# Patient Record
Sex: FEMALE | Race: AMERICAN INDIAN OR ALASKA NATIVE
[De-identification: names, ages, dates, MRNs, and addresses within clinical notes are randomized per-mention and may not be internally consistent; named-entity substitution may affect disease eponyms.]

---

## 2017-11-21 ENCOUNTER — HOSPITAL ENCOUNTER (INPATIENT)
Dept: HOSPITAL 5 - ED | Age: 37
LOS: 2 days | Discharge: HOME HEALTH | DRG: 65 | End: 2017-11-23
Attending: INTERNAL MEDICINE | Admitting: INTERNAL MEDICINE
Payer: COMMERCIAL

## 2017-11-21 DIAGNOSIS — F17.210: ICD-10-CM

## 2017-11-21 DIAGNOSIS — I63.9: Primary | ICD-10-CM

## 2017-11-21 DIAGNOSIS — Z86.73: ICD-10-CM

## 2017-11-21 DIAGNOSIS — Z82.49: ICD-10-CM

## 2017-11-21 DIAGNOSIS — Z83.3: ICD-10-CM

## 2017-11-21 DIAGNOSIS — Z88.6: ICD-10-CM

## 2017-11-21 DIAGNOSIS — Z79.84: ICD-10-CM

## 2017-11-21 DIAGNOSIS — J45.909: ICD-10-CM

## 2017-11-21 DIAGNOSIS — I10: ICD-10-CM

## 2017-11-21 DIAGNOSIS — Z79.899: ICD-10-CM

## 2017-11-21 DIAGNOSIS — E11.9: ICD-10-CM

## 2017-11-21 DIAGNOSIS — D72.829: ICD-10-CM

## 2017-11-21 DIAGNOSIS — G81.91: ICD-10-CM

## 2017-11-21 LAB
%HYPO/RBC NFR BLD AUTO: (no result) %
ANION GAP SERPL CALC-SCNC: 14 MMOL/L
ANISOCYTOSIS BLD QL SMEAR: (no result)
APTT BLD: 28.5 SEC. (ref 24.2–36.6)
BLASTOCYTES % (MANUAL): 0 %
BUN SERPL-MCNC: 7 MG/DL (ref 7–17)
BUN/CREAT SERPL: 18 %
CALCIUM SERPL-MCNC: 8.4 MG/DL (ref 8.4–10.2)
CHLORIDE SERPL-SCNC: 104 MMOL/L (ref 98–107)
CO2 SERPL-SCNC: 29 MMOL/L (ref 22–30)
GLUCOSE SERPL-MCNC: 96 MG/DL (ref 65–100)
HCT VFR BLD CALC: 35.3 % (ref 30.3–42.9)
HGB BLD-MCNC: 10.7 GM/DL (ref 10.1–14.3)
INR PPP: 0.89 (ref 0.87–1.13)
MAGNESIUM SERPL-MCNC: 1.8 MG/DL (ref 1.7–2.3)
MCH RBC QN AUTO: 24 PG (ref 28–32)
MCHC RBC AUTO-ENTMCNC: 30 % (ref 30–34)
MCV RBC AUTO: 79 FL (ref 79–97)
PLATELET # BLD: 167 K/MM3 (ref 140–440)
POTASSIUM SERPL-SCNC: 3.5 MMOL/L (ref 3.6–5)
RBC # BLD AUTO: 4.47 M/MM3 (ref 3.65–5.03)
SODIUM SERPL-SCNC: 143 MMOL/L (ref 137–145)
TOTAL CELLS COUNTED PERCENT: 2
WBC # BLD AUTO: 13.4 K/MM3 (ref 4.5–11)

## 2017-11-21 PROCEDURE — 83735 ASSAY OF MAGNESIUM: CPT

## 2017-11-21 PROCEDURE — 70498 CT ANGIOGRAPHY NECK: CPT

## 2017-11-21 PROCEDURE — 80061 LIPID PANEL: CPT

## 2017-11-21 PROCEDURE — 85610 PROTHROMBIN TIME: CPT

## 2017-11-21 PROCEDURE — 93306 TTE W/DOPPLER COMPLETE: CPT

## 2017-11-21 PROCEDURE — 85025 COMPLETE CBC W/AUTO DIFF WBC: CPT

## 2017-11-21 PROCEDURE — 85027 COMPLETE CBC AUTOMATED: CPT

## 2017-11-21 PROCEDURE — 84703 CHORIONIC GONADOTROPIN ASSAY: CPT

## 2017-11-21 PROCEDURE — 85007 BL SMEAR W/DIFF WBC COUNT: CPT

## 2017-11-21 PROCEDURE — 93005 ELECTROCARDIOGRAM TRACING: CPT

## 2017-11-21 PROCEDURE — 36415 COLL VENOUS BLD VENIPUNCTURE: CPT

## 2017-11-21 PROCEDURE — 70450 CT HEAD/BRAIN W/O DYE: CPT

## 2017-11-21 PROCEDURE — 70544 MR ANGIOGRAPHY HEAD W/O DYE: CPT

## 2017-11-21 PROCEDURE — 82962 GLUCOSE BLOOD TEST: CPT

## 2017-11-21 PROCEDURE — 80048 BASIC METABOLIC PNL TOTAL CA: CPT

## 2017-11-21 PROCEDURE — 99285 EMERGENCY DEPT VISIT HI MDM: CPT

## 2017-11-21 PROCEDURE — 93880 EXTRACRANIAL BILAT STUDY: CPT

## 2017-11-21 PROCEDURE — 96372 THER/PROPH/DIAG INJ SC/IM: CPT

## 2017-11-21 PROCEDURE — 93010 ELECTROCARDIOGRAM REPORT: CPT

## 2017-11-21 PROCEDURE — 85730 THROMBOPLASTIN TIME PARTIAL: CPT

## 2017-11-21 PROCEDURE — 83036 HEMOGLOBIN GLYCOSYLATED A1C: CPT

## 2017-11-21 PROCEDURE — 70551 MRI BRAIN STEM W/O DYE: CPT

## 2017-11-21 RX ADMIN — INSULIN ASPART SCH: 100 INJECTION, SOLUTION INTRAVENOUS; SUBCUTANEOUS at 22:12

## 2017-11-21 NOTE — HISTORY AND PHYSICAL REPORT
History of Present Illness


Date of examination: 11/21/17


Date of admission: 


11/21/17





Chief complaint: 


Right upper extremity weakness





History of present illness: 


Patient is 37-year-old with history of diabetes, asthma and TIA.  She presents 

with right upper extremity numbness tingling and weakness for 2 days.  She 

states symptoms started yesterday when she had sudden numbness, tingling and 

weakness in the right upper extremity.  Initially she can still move however 

weakness became worse and today she cannot move right upper extremity at all. 

She therefore came to Emergency epartment for evaluation.  In ED,  CT head 

showed a possible acute ischemic stroke.  She will be admitted for further 

evaluation and management of acute ischemic stroke





Past History


Past Medical History: diabetes, other (TIA,asthma)


Past Surgical History: denies: No surgical history


Social history: lives with family, smoking, alcohol abuse, full code


Family history: CAD, diabetes





Medications and Allergies


 Allergies











Allergy/AdvReac Type Severity Reaction Status Date / Time


 


aspirin Allergy Unknown Unknown Verified 11/21/17 11:41











 Home Medications











 Medication  Instructions  Recorded  Confirmed  Last Taken  Type


 


Metformin HCl 1,000 mg PO BID 11/21/17 11/21/17 Unknown History


 


glipiZIDE [glipiZIDE ER] 5 mg PO DAILY 11/21/17 11/21/17 Unknown History














Review of Systems


All systems: negative (no chest pain, no shortness of breath, no fever, no 

abdominal pain, no urinary symptoms.  All other systems reviewed and are 

negative)





Exam





- Physical Exam


Narrative exam: 


GEN  APPEARANCE : Not in acute distress,


HEENT: Normocephalic  Atraumatic


NECK : supple, no JVD


LUNGS: clear to auscultation bilaterally, no rales, no wheeze


HEART: S1 and S2 regular, no murmurs, rubs or gallop


ABD: Soft, no tenderness, no distension, normal bowel sounds


EXT: No edema, no clubbing, no cyanosis


NEURO:Awake,alert,oriented x 3, no facial asymmetry, muscle power RUE 0/5, LUE 5

/5, RLE 4/5, LLE 5/5














- Constitutional


Vitals: 


 











Temp Pulse Resp BP Pulse Ox


 


 98.0 F   71   25 H  165/88   99 


 


 11/21/17 11:23  11/21/17 12:46  11/21/17 12:43  11/21/17 12:43  11/21/17 11:23














Results





- Labs


CBC & Chem 7: 


 11/21/17 13:48





 11/21/17 13:48


Labs: 


 Abnormal lab results











  11/21/17 11/21/17 11/21/17 Range/Units





  13:48 13:48 14:06 


 


WBC  13.4 H    (4.5-11.0)  K/mm3


 


MCH  24 L    (28-32)  pg


 


RDW  16.7 H    (13.2-15.2)  %


 


Seg Neutrophils # Man  8.4 H    (1.8-7.7)  K/mm3


 


Potassium   3.5 L   (3.6-5.0)  mmol/L


 


Creatinine   0.4 L   (0.7-1.2)  mg/dL


 


POC Glucose    131 H  ()  














Assessment and Plan


Acute ischemic stroke.  Admit to telemetry.  Patient presented with right upper 

extremity weakness and found to have right upper and lower extremity weakness.  

CT shows likely acute ischemic stroke. Admit using stroke protocol.  Obtain MRI 

and MRA of the brain, echocardiogram, carotid Dopplers.  She's allergic to 

aspirin. Start Plavix 75 mg by mouth now and daily.





Diabetes mellitus type II.  Fingerstick glucose Qac and hs. Continue glipizide 

and hold metformin.





Leukocytosis. Probably reactive. will monitor. Repeat in am.





Elevated Blood pressure elevated 156/81.  She denies any previous history of 

hypertension.  We'll only treat within first 24 hours if systolic BP greater 

than 220.





DVT prophylaxis with Lovenox





Full code status

## 2017-11-21 NOTE — MAGNETIC RESONANCE REPORT
FINAL REPORT



PROCEDURE:  MR BRAIN WO CON



TECHNIQUE:  Magnetic resonance imaging of the brain was performed

without contrast material. 



HISTORY:  stroke 



COMPARISON:  Head CT dated November 21, 2017



FINDINGS:  

Restricted diffusion is seen focally in the left corona radiata.

This corresponds to area of diminished density on CT study. It

has mild increased T2 signal and is consistent with a recent

infarct. Cerebral ventricles are normal in size. Cerebellar

tonsils are normally positioned. No intracranial hemorrhage or

mass effect is seen. Changes of chronic sinusitis are seen. 



IMPRESSION:  

Recent lacunar infarct is seen in the left corona radiata. No

evidence of hemorrhagic transformation is seen.

## 2017-11-21 NOTE — EMERGENCY DEPARTMENT REPORT
ED Neuro Deficit HPI





- General


Chief Complaint: Neuro Symptoms/Deficit


Stated Complaint: RIGHT SIDED WEAKNESS/NUMBNESS


Time Seen by Provider: 11/21/17 13:33


Source: EMS


Mode of arrival: Stretcher


Limitations: Physical Limitation





- History of Present Illness


Initial Comments: 





37-year-old female with a past medical history of asthma, diabetes, a mild 

stroke at age of 23 presents to the hospital with complaints of right sided 

numbness and weakness since yesterday afternoon.   Weakness and numbness 

includes right face, right arm, and right leg.  Weakness has been constant and 

progressively worsened and now patient is unable to lift her right arm without 

using her left hand to do so.  Patient denies headache, blurred vision, or 

slurred speech.  She is allergic to aspirin and does not take any other 

anticoagulants.  Patient presents here with elevated blood pressure and denies 

previous known history of hypertension.





- Related Data


Allergies/Adverse Reactions: 


 Allergies











Allergy/AdvReac Type Severity Reaction Status Date / Time


 


aspirin Allergy Unknown Unknown Verified 11/21/17 11:41














ED Review of Systems


ROS: 


Stated complaint: RIGHT SIDED WEAKNESS/NUMBNESS


Other details as noted in HPI





Comment: All other systems reviewed and negative


Other: 





Constitutional: No fevers chills 


Eyes: No eye pain visual changes


ENT: No ear pain or throat pain


Neck: Denies pain


Respiratory: Denies cough wheezing shortness of breath 


Cardiovascular: Denies chest pain, palpitations, syncope


GI: Denies abdominal pain, nausea, vomiting, diarrhea


: Denies dysuria


Musculoskeletal: Denies back pain, joint swelling 


Skin: Denies rash, lesions, erythema


Neurologic: As per HPI


Psychiatric: Denies suicidal ideation, hallucinations








ED Past Medical Hx





- Past Medical History


Hx Diabetes: Yes


Hx Asthma: Yes





- Surgical History


Past Surgical History?: No





- Social History


Smoking Status: Current Every Day Smoker


Substance Use Type: Alcohol





ED Neuro Physical Exam





- General


Limitations: Physical Limitation


Suspected Stroke: Yes





- NIHSS


Assessment Interval: Baseline


1a. Level of Consciousness: alert


1b. LOC Questions: answers correctly


1c. LOC Commands: performs tasks correctly


2. Best Gaze: normal


3. Visual: no visual loss


4. Facial Palsy: normal symmetrical movement


5b. Motor Arm Right: no movement


5a. Motor Arm Left: no drift


6a. Motor Leg Left: no drift


6b. Motor Leg Right: drift


7. Limb Ataxia: absent


8. Sensory: mild/moderate sensory loss (right face, right arm, right leg)


9. Best Language: no aphasia


10. Dysarthria: normal


11. Extinction/Inattention: no abnormality


Total Score: 6


Stroke Severity: Moderate Stroke





- Other


Other exam information: 





General: No limitations, patient is alert in no acute distress


Head exam: Atraumatic, normocephalic


Eyes exam: Normal appearance, pupils equal reactive to light, extraocular 

movements intact


ENT: Moist mucous membrane, normal oropharynx


Neck exam: Normal inspection, full range of motion, no meningismus nontender


Respiratory exam: Clear to auscultation bilateral, no wheezes, rales, crackles


Cardiovascular: Normal rate and rhythm, normal heart sounds


Abdomen: Soft, nondistended, and  nontender, with normal bowel sounds, no 

rebound, or guarding


Extremity: Full range of motion normal inspection no deformity


Back: Normal Inspection, full range of motion, no tenderness


Neurologic: Alert, oriented x3, as per HPI


Psychiatric: normal affect, normal mood


Skin: Warm, dry, intact





ED Course


 Vital Signs











  11/21/17 11/21/17 11/21/17





  11:23 12:05 12:43


 


Temperature 98.0 F  


 


Pulse Rate 71  71


 


Respiratory 16 16 25 H





Rate   


 


Blood Pressure 157/110  


 


Blood Pressure 157/110  165/88





[Right]   


 


O2 Sat by Pulse 99  





Oximetry   














  11/21/17





  12:46


 


Temperature 


 


Pulse Rate 71


 


Respiratory 





Rate 


 


Blood Pressure 


 


Blood Pressure 





[Right] 


 


O2 Sat by Pulse 





Oximetry 














- Reevaluation(s)


Reevaluation #1: 





11/21/17 15:32


Patient is upset about staying in the hospital because she is living in a hotel 

and is worried about her children and her animals.  I encouraged patient to 

stay in the hospital for further treatment.  Patient states she feels like the 

sensation arm is returning








- Lab Data


Result diagrams: 


 11/21/17 13:48





 11/21/17 13:48


 Lab Results











  11/21/17 11/21/17 11/21/17 Range/Units





  13:48 13:48 13:48 


 


WBC  13.4 H    (4.5-11.0)  K/mm3


 


RBC  4.47    (3.65-5.03)  M/mm3


 


Hgb  10.7    (10.1-14.3)  gm/dl


 


Hct  35.3    (30.3-42.9)  %


 


MCV  79    (79-97)  fl


 


MCH  24 L    (28-32)  pg


 


MCHC  30    (30-34)  %


 


RDW  16.7 H    (13.2-15.2)  %


 


Plt Count  167    (140-440)  K/mm3


 


Lymph #  Np    


 


Add Manual Diff  Complete    


 


Total Counted  100    


 


Seg Neuts % (Manual)  63.0    (40.0-70.0)  %


 


Band Neutrophils %  0    %


 


Lymphocytes % (Manual)  35.0    (13.4-35.0)  %


 


Reactive Lymphs % (Man)  0    %


 


Monocytes % (Manual)  2.0    (0.0-7.3)  %


 


Eosinophils % (Manual)  0    (0.0-4.3)  %


 


Basophils % (Manual)  0    (0.0-1.8)  %


 


Metamyelocytes %  0    %


 


Myelocytes %  0    %


 


Promyelocytes %  0    %


 


Blast Cells %  0    %


 


Nucleated RBC %  Not Reportable    


 


Seg Neutrophils # Man  8.4 H    (1.8-7.7)  K/mm3


 


Band Neutrophils #  0.0    K/mm3


 


Lymphocytes # (Manual)  4.7    (1.2-5.4)  K/mm3


 


Abs React Lymphs (Man)  0.0    K/mm3


 


Monocytes # (Manual)  0.3    (0.0-0.8)  K/mm3


 


Eosinophils # (Manual)  0.0    (0.0-0.4)  K/mm3


 


Basophils # (Manual)  0.0    (0.0-0.1)  K/mm3


 


Metamyelocytes #  0.0    K/mm3


 


Myelocytes #  0.0    K/mm3


 


Promyelocytes #  0.0    K/mm3


 


Blast Cells #  0.0    K/mm3


 


WBC Morphology  Not Reportable    


 


Hypersegmented Neuts  Not Reportable    


 


Hyposegmented Neuts  Not Reportable    


 


Hypogranular Neuts  Not Reportable    


 


Smudge Cells  Not Reportable    


 


Toxic Granulation  Not Reportable    


 


Toxic Vacuolation  Not Reportable    


 


Dohle Bodies  Not Reportable    


 


Pelger-Huet Anomaly  Not Reportable    


 


Jeanie Rods  Not Reportable    


 


Platelet Estimate  Cons    


 


Clumped Platelets  Not Reportable    


 


Plt Clumps, EDTA  Not Reportable    


 


Large Platelets  Not Reportable    


 


Giant Platelets  Not Reportable    


 


Platelet Satelliting  Not Reportable    


 


Plt Morphology Comment  Not Reportable    


 


RBC Morphology  Not Reportable    


 


Dimorphic RBCs  Not Reportable    


 


Polychromasia  Not Reportable    


 


Hypochromasia  1+    


 


Poikilocytosis  Not Reportable    


 


Anisocytosis  1+    


 


Microcytosis  Not Reportable    


 


Macrocytosis  Not Reportable    


 


Spherocytes  Not Reportable    


 


Pappenheimer Bodies  Not Reportable    


 


Sickle Cells  Not Reportable    


 


Target Cells  Not Reportable    


 


Tear Drop Cells  Not Reportable    


 


Ovalocytes  Not Reportable    


 


Helmet Cells  Not Reportable    


 


[place] Bodies  Not Reportable    


 


Cabot Rings  Not Reportable    


 


Jina Cells  Not Reportable    


 


Bite Cells  Not Reportable    


 


Crenated Cell  Not Reportable    


 


Elliptocytes  Not Reportable    


 


Acanthocytes (Spur)  Not Reportable    


 


Rouleaux  Not Reportable    


 


Hemoglobin C Crystals  Not Reportable    


 


Schistocytes  Not Reportable    


 


Malaria parasites  Not Reportable    


 


Ayad Bodies  Not Reportable    


 


Hem Pathologist Commnt  No    


 


Sodium   143   (137-145)  mmol/L


 


Potassium   3.5 L   (3.6-5.0)  mmol/L


 


Chloride   104.0   ()  mmol/L


 


Carbon Dioxide   29   (22-30)  mmol/L


 


Anion Gap   14   mmol/L


 


BUN   7   (7-17)  mg/dL


 


Creatinine   0.4 L   (0.7-1.2)  mg/dL


 


Estimated GFR   > 60   ml/min


 


BUN/Creatinine Ratio   18   %


 


Glucose   96   ()  mg/dL


 


POC Glucose     ()  


 


Calcium   8.4   (8.4-10.2)  mg/dL


 


Magnesium   1.80   (1.7-2.3)  mg/dL


 


HCG, Qual    Negative  (Negative)  














  11/21/17 Range/Units





  14:06 


 


WBC   (4.5-11.0)  K/mm3


 


RBC   (3.65-5.03)  M/mm3


 


Hgb   (10.1-14.3)  gm/dl


 


Hct   (30.3-42.9)  %


 


MCV   (79-97)  fl


 


MCH   (28-32)  pg


 


MCHC   (30-34)  %


 


RDW   (13.2-15.2)  %


 


Plt Count   (140-440)  K/mm3


 


Lymph #   


 


Add Manual Diff   


 


Total Counted   


 


Seg Neuts % (Manual)   (40.0-70.0)  %


 


Band Neutrophils %   %


 


Lymphocytes % (Manual)   (13.4-35.0)  %


 


Reactive Lymphs % (Man)   %


 


Monocytes % (Manual)   (0.0-7.3)  %


 


Eosinophils % (Manual)   (0.0-4.3)  %


 


Basophils % (Manual)   (0.0-1.8)  %


 


Metamyelocytes %   %


 


Myelocytes %   %


 


Promyelocytes %   %


 


Blast Cells %   %


 


Nucleated RBC %   


 


Seg Neutrophils # Man   (1.8-7.7)  K/mm3


 


Band Neutrophils #   K/mm3


 


Lymphocytes # (Manual)   (1.2-5.4)  K/mm3


 


Abs React Lymphs (Man)   K/mm3


 


Monocytes # (Manual)   (0.0-0.8)  K/mm3


 


Eosinophils # (Manual)   (0.0-0.4)  K/mm3


 


Basophils # (Manual)   (0.0-0.1)  K/mm3


 


Metamyelocytes #   K/mm3


 


Myelocytes #   K/mm3


 


Promyelocytes #   K/mm3


 


Blast Cells #   K/mm3


 


WBC Morphology   


 


Hypersegmented Neuts   


 


Hyposegmented Neuts   


 


Hypogranular Neuts   


 


Smudge Cells   


 


Toxic Granulation   


 


Toxic Vacuolation   


 


Dohle Bodies   


 


Pelger-Huet Anomaly   


 


Jeanie Rods   


 


Platelet Estimate   


 


Clumped Platelets   


 


Plt Clumps, EDTA   


 


Large Platelets   


 


Giant Platelets   


 


Platelet Satelliting   


 


Plt Morphology Comment   


 


RBC Morphology   


 


Dimorphic RBCs   


 


Polychromasia   


 


Hypochromasia   


 


Poikilocytosis   


 


Anisocytosis   


 


Microcytosis   


 


Macrocytosis   


 


Spherocytes   


 


Pappenheimer Bodies   


 


Sickle Cells   


 


Target Cells   


 


Tear Drop Cells   


 


Ovalocytes   


 


Helmet Cells   


 


[place] Bodies   


 


Cabot Rings   


 


Jina Cells   


 


Bite Cells   


 


Crenated Cell   


 


Elliptocytes   


 


Acanthocytes (Spur)   


 


Rouleaux   


 


Hemoglobin C Crystals   


 


Schistocytes   


 


Malaria parasites   


 


Ayad Bodies   


 


Hem Pathologist Commnt   


 


Sodium   (137-145)  mmol/L


 


Potassium   (3.6-5.0)  mmol/L


 


Chloride   ()  mmol/L


 


Carbon Dioxide   (22-30)  mmol/L


 


Anion Gap   mmol/L


 


BUN   (7-17)  mg/dL


 


Creatinine   (0.7-1.2)  mg/dL


 


Estimated GFR   ml/min


 


BUN/Creatinine Ratio   %


 


Glucose   ()  mg/dL


 


POC Glucose  131 H  ()  


 


Calcium   (8.4-10.2)  mg/dL


 


Magnesium   (1.7-2.3)  mg/dL


 


HCG, Qual   (Negative)  














- EKG Data


-: EKG Interpreted by Me


EKG shows normal: sinus rhythm, axis (qrs 54), QRS complexes (97, lvh), ST-T 

waves (no stemi/t inv)


Rate: normal (72)





- Radiology Data


Radiology results: report reviewed


ct head: decreased density within the left body of the caudate nucleus/bits 

left corona radiata.  11 x 9 mm.  Acute to subacute infarction is not excluded.

  Chronic bilateral ethmoid and maxillary sinusitis





- Medical Decision Making





Plan to admit patient to the hospital for further stroke workup.  Patient is 

outside of the window of TPA and cannot receive aspirin due to allergy.  

Hospitalist to determine if Plavix as indicated.  At this time patient is 

agreeable to admission





- Differential Diagnosis


multiple sclerosis, acute ischemic CVA, hemorrhagic CVA,





- Thrombolytic Inclusion/Exclusion


Thrombolytic Exclusion Criteria: Symptom Onset > 3 Hours


Critical Care Time: No


Critical care attestation.: 


If time is entered above; I have spent that time in minutes in the direct care 

of this critically ill patient, excluding procedure time.








ED Disposition


Clinical Impression: 


 Acute ischemic stroke, Right sided weakness, Hypertension, Diabetes





Disposition: [id number] OP ADMIT IP TO THIS HOSP


Is pt being admited?: Yes


Does the pt Need Aspirin: No (allergic)


Time of Disposition: 15:25 (Dr Burr/hosp)

## 2017-11-21 NOTE — CAT SCAN REPORT
FINAL REPORT



PROCEDURE:  CT HEAD/BRAIN WO CON



TECHNIQUE:  Computerized tomography of the head was performed

without contrast material. 



HISTORY:  right sided weakness and numbness 



COMPARISON:  None



FINDINGS:  

11 x 9 millimeter subtle decreased density is present of the body

of the left caudate nucleus/mid left corona radiata. There is no

intra or extra-axial hemorrhage. There is no mass effect or shift

of midline structures. There is no hydrocephalus. Chronic

bilateral maxillary and ethmoidal sinusitis is present. The skull

base and calvarium are intact. 



IMPRESSION:  

Decreased density within the left body of the caudate nucleus/mid

left corona radiata. This is 11 x 9 millimeters. Acute/subacute

infarction is not excluded. If desired, MRI with diffusion may be

of benefit. 



Chronic bilateral ethmoidal and maxillary sinusitis.

## 2017-11-21 NOTE — MAGNETIC RESONANCE REPORT
FINAL REPORT



PROCEDURE:  MR MRA/MRV HEAD WO CON



TECHNIQUE:  Unenhanced 3D time-of-flight images of the vessels of

the [place] are obtained.



HISTORY:  stroke  right-sided weakness 



COMPARISON:  No prior studies are available for comparison.



FINDINGS:  

 Right vertebral artery is dominant. Left vertebral artery

appears to terminate in PICA. Bilateral posterior communicating

arteries are seen and both P1 segments are present. Small

anterior communicating artery is seen. There is focal loss of

signal of the ICAs at the skull base. This is probably due to

motion artifact given the symmetric appearance. No diminished

flow is seen in the ICAs distal to this level. No intracranial

aneurysm is seen. There is a small size of the proximal right A2

segment. 



IMPRESSION:  

Likely artifactual loss of signal is seen in both ICAs in the

proximal carotid canals. 



Proximal right A2 segment is small which could be from artifact

or true stenosis. Correlation with CTA may be useful.

## 2017-11-22 LAB
ANION GAP SERPL CALC-SCNC: 15 MMOL/L
BUN SERPL-MCNC: 6 MG/DL (ref 7–17)
BUN/CREAT SERPL: 12 %
CALCIUM SERPL-MCNC: 8.2 MG/DL (ref 8.4–10.2)
CHLORIDE SERPL-SCNC: 105.2 MMOL/L (ref 98–107)
CHOLEST SERPL-MCNC: 146 MG/DL (ref 50–199)
CO2 SERPL-SCNC: 28 MMOL/L (ref 22–30)
GLUCOSE SERPL-MCNC: 111 MG/DL (ref 65–100)
HCT VFR BLD CALC: 33 % (ref 30.3–42.9)
HDLC SERPL-MCNC: 29 MG/DL (ref 40–59)
HGB BLD-MCNC: 10.5 GM/DL (ref 10.1–14.3)
MCH RBC QN AUTO: 25 PG (ref 28–32)
MCHC RBC AUTO-ENTMCNC: 32 % (ref 30–34)
MCV RBC AUTO: 78 FL (ref 79–97)
PLATELET # BLD: 174 K/MM3 (ref 140–440)
POTASSIUM SERPL-SCNC: 3.4 MMOL/L (ref 3.6–5)
RBC # BLD AUTO: 4.23 M/MM3 (ref 3.65–5.03)
SODIUM SERPL-SCNC: 145 MMOL/L (ref 137–145)
TRIGL SERPL-MCNC: 149 MG/DL (ref 2–149)
WBC # BLD AUTO: 9.4 K/MM3 (ref 4.5–11)

## 2017-11-22 RX ADMIN — INSULIN ASPART SCH: 100 INJECTION, SOLUTION INTRAVENOUS; SUBCUTANEOUS at 22:38

## 2017-11-22 RX ADMIN — INSULIN ASPART SCH: 100 INJECTION, SOLUTION INTRAVENOUS; SUBCUTANEOUS at 11:30

## 2017-11-22 RX ADMIN — ENOXAPARIN SODIUM SCH MG: 100 INJECTION SUBCUTANEOUS at 10:52

## 2017-11-22 RX ADMIN — INSULIN ASPART SCH: 100 INJECTION, SOLUTION INTRAVENOUS; SUBCUTANEOUS at 07:45

## 2017-11-22 RX ADMIN — INSULIN ASPART SCH: 100 INJECTION, SOLUTION INTRAVENOUS; SUBCUTANEOUS at 17:30

## 2017-11-22 RX ADMIN — CLOPIDOGREL BISULFATE SCH MG: 75 TABLET ORAL at 10:52

## 2017-11-22 RX ADMIN — GLIPIZIDE SCH MG: 5 TABLET, FILM COATED, EXTENDED RELEASE ORAL at 10:52

## 2017-11-22 NOTE — CAT SCAN REPORT
FINAL REPORT



PROCEDURE:  CT ANGIO NECK



TECHNIQUE:  Computerized tomographic angiography of the neck was

performed after the IV injection of iodinated nonionic contrast

including image processing. The image data was postprocessed

using 2-dimensional multiplanar reformatted (MPR) and

3-dimensional (MIP and/or volume rendered) techniques. 



HISTORY:  Stroke 



COMPARISON:  No prior studies are available for comparison.



Note: Assessment of carotid artery stenosis is based on 

measurement of the distal internal carotid artery diameter as the

denominator for stenosis calculations and the [place] [employer]

Symptomatic Carotid Endarterectomy Trial (NASCET) stenosis

criteria . CPT 3100F



FINDINGS:  

Thyroid gland is mildly prominent with multiple cysts or nodules.

Correlation with ultrasound is recommended. Right vertebral

artery is dominant. Left vertebral artery may terminate in PICA.

No vertebral artery stenosis is seen. No stenosis is seen of the

common carotid or internal carotid arteries in the neck. 



IMPRESSION:  

No vertebral or carotid artery stenosis is seen in the neck.



Cyst and/or nodules are seen in the thyroid gland. Correlation

with ultrasound is recommended.

## 2017-11-22 NOTE — CONSULTATION
History of Present Illness





- Reason for Consult


Consult date: 11/22/17


Left Hemisphere Stroke with Carotid Stenosis





- History of Present Illness





This patient is a 37-year-old  female that was admitted via the 

emergency room with an acute onset of right sided weakness 2 days prior to this 

admission.  Additionally, She was able to understand language, but was unable 

to speak.  She did not initially seek medical treatment hoping that this would 

be self-limited.  Her symptoms have nearly resolved at this point except for 

mild right upper extremity weakness.  A CT scan of the head suggested an acute/

subacute infarction.  An MRI suggests a recent lacunar infarct in the left 

corona radiata.





A preliminary carotid duplex suggest a left internal carotid artery stenosis of 

50-79% with less than 50% stenosis on the right based upon velocity.





A vascular surgery consult has been requested to further evaluate.





The patient does not take antiplatelet therapy on a daily basis.





Past History


Past Medical History: diabetes, other (asthma)


Past Surgical History: denies: No surgical history


Social history: lives with family (son), smoking (1 pack per day), alcohol abuse

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


Family history: CAD, diabetes





Medications and Allergies


 Allergies











Allergy/AdvReac Type Severity Reaction Status Date / Time


 


aspirin Allergy Unknown Unknown Verified 11/21/17 11:41











 Home Medications











 Medication  Instructions  Recorded  Confirmed  Last Taken  Type


 


Metformin HCl 1,000 mg PO BID 11/21/17 11/21/17 Unknown History


 


glipiZIDE [glipiZIDE ER] 5 mg PO DAILY 11/21/17 11/21/17 Unknown History











Active Meds: 


Active Medications





Acetaminophen (Tylenol)  650 mg PO Q4H PRN


   PRN Reason: Pain, Mild (1-3)


Atorvastatin Calcium (Lipitor)  40 mg PO QHS [facility]


Bisacodyl (Dulcolax)  10 mg GA QDAY PRN


   PRN Reason: Constipation


Clopidogrel Bisulfate (Plavix)  75 mg PO QDAY [facility]


   Last Admin: 11/22/17 10:52 Dose:  75 mg


Dextrose (D50w (25gm) Vial)  25 gm IV PRN PRN


   PRN Reason: Hypoglycemia


Enoxaparin Sodium (Lovenox)  40 mg SUB-Q QDAY [facility]


   Last Admin: 11/22/17 10:52 Dose:  40 mg


Glipizide (Glucotrol Xl)  5 mg PO DAILY [facility]


   Last Admin: 11/22/17 10:52 Dose:  5 mg


Insulin Aspart (Novolog)  0 units SUB-Q [facility]


   PRN Reason: Protocol


   Last Admin: 11/22/17 07:45 Dose:  Not Given


Insulin Aspart (Novolog)  0 units SUB-Q QHS [facility]


   PRN Reason: Protocol


   Last Admin: 11/21/17 22:12 Dose:  Not Given


Magnesium Hydroxide (Milk Of Magnesia)  30 ml PO Q4H PRN


   PRN Reason: Constipation


Metoclopramide HCl (Reglan)  10 mg PO Q6H PRN


   PRN Reason: Nausea And Vomiting


Morphine Sulfate (Morphine)  2 mg IV Q4H PRN


   PRN Reason: Pain, Moderate (4-6)


Ondansetron HCl (Zofran)  4 mg IV Q6H PRN


   PRN Reason: nausea or vomiting


Sodium Chloride (Sodium Chloride Flush Syringe 10 Ml)  10 ml IV PRN PRN


   PRN Reason: LINE FLUSH











Review of Systems


All systems: negative





Exam





- Constitutional


Vitals: 


 











Temp Pulse Resp BP Pulse Ox


 


 97.8 F   80   20   161/92   100 


 


 11/22/17 11:01  11/22/17 11:01  11/22/17 11:01  11/22/17 11:01  11/22/17 11:01











General appearance: Present: no acute distress





- EENT


Eyes: Present: EOM intact


ENT: hearing intact





- Respiratory


Respiratory effort: normal





- Cardiovascular


Rhythm: regular





- Extremities


Extremities: no ischemia





- Psychiatric


Psychiatric: appropriate mood/affect, intact judgment & insight, cooperative





- Neurologic


Neurologic: focal deficits (mild right upper extremity weakness when compared 

to the left)





Results





- Labs


CBC & Chem 7: 


 11/22/17 05:35





 11/22/17 05:35


Labs: 


 Abnormal lab results











  11/21/17 11/21/17 11/22/17 Range/Units





  13:48 20:29 05:35 


 


MCV     (79-97)  fl


 


MCH     (28-32)  pg


 


RDW     (13.2-15.2)  %


 


Potassium     (3.6-5.0)  mmol/L


 


BUN     (7-17)  mg/dL


 


Creatinine     (0.7-1.2)  mg/dL


 


Glucose     ()  mg/dL


 


POC Glucose   137 H   ()  


 


Hemoglobin A1c  8.2 H    (4-6)  %


 


Calcium     (8.4-10.2)  mg/dL


 


HDL Cholesterol    29 L  (40-59)  mg/dL














  11/22/17 11/22/17 11/22/17 Range/Units





  05:35 05:35 11:26 


 


MCV  78 L    (79-97)  fl


 


MCH  25 L    (28-32)  pg


 


RDW  16.3 H    (13.2-15.2)  %


 


Potassium   3.4 L   (3.6-5.0)  mmol/L


 


BUN   6 L   (7-17)  mg/dL


 


Creatinine   0.5 L   (0.7-1.2)  mg/dL


 


Glucose   111 H   ()  mg/dL


 


POC Glucose    183 H  ()  


 


Hemoglobin A1c     (4-6)  %


 


Calcium   8.2 L   (8.4-10.2)  mg/dL


 


HDL Cholesterol     (40-59)  mg/dL














Assessment and Plan





This patient presented with a 2 day history of acute right sided hemiparesis.  

Her symptoms have nearly resolved except for mild right upper extremity 

weakness.  





An MRI suggested a recent lacunar infarction.





A carotid duplex suggested a left internal carotid artery stenosis of 50-79% 

based upon velocity alone without evidence of significant intra-arterial 

plaque.  Her peak systolic velocities are in the 130s.  She has less than 50% 

stenosis on the left.  A vascular surgery consult was requested to further 

evaluate.  The patient does not take antiplatelet medication as an outpatient.





We'll check a CT angiogram of the carotids.





Lacunar infarcts do not typically represent embolic strokes.  Recommend 

neurology consult.  





We'll defer best medical management to neurology and hospitalist.








- Patient Problems


(1) Acute lacunar infarction


Current Visit: Yes   Status: Acute   





(2) Diabetes


Current Visit: Yes   Status: Acute   





(3) Hypertension


Current Visit: Yes   Status: Acute

## 2017-11-22 NOTE — PROGRESS NOTE
Assessment and Plan


Assessment and plan: 


CVA with mild right-sided weakness


- Patient is on Plavix and statin


-Physical therapy


-Neurology consult


Permissive hypertension


Carotid Doppler ultrasound


- Showed 50-79% stenosis


- Vascular consulted and recommended CTA neck


DVT prophylaxis


-Lovenox


Disposition


-Continue inpatient











History


Interval history: 


Patient was seen and evaluated this morning, patient has mild right upper 

extremity weakness, markedly improved.





Hospitalist Physical





- Physical exam


Narrative exam: 


 Not in cardiopulmonary distress. 


 The patient appeared well nourished and normally developed.


 Vital signs as documented.


 Head exam is unremarkable.


 No scleral icterus .


 Neck is without jugular venous distension, thyromegaly, or carotid bruits. 


 Lungs are clear to auscultation.


Cardiac exam reveals regular rate and  Rhythm. First and second heart sounds 

normal. No murmurs, rubs or gallops. 


Abdominal exam reveals normal bowel sounds, no masses, no organomegaly and no 

aortic enlargement. 


Extremities are nonedematous and both femoral and pedal pulses are normal.


CNS: Alert and oriented 3.  Right upper extremity mild weakness.








- Constitutional


Vitals: 


 











Temp Pulse Resp BP Pulse Ox


 


 97.8 F   80   20   161/92   100 


 


 11/22/17 11:01  11/22/17 11:01  11/22/17 11:01  11/22/17 11:01  11/22/17 11:01











General appearance: Present: no acute distress





Results





- Labs


CBC & Chem 7: 


 11/22/17 05:35





 11/22/17 05:35


Labs: 


 Laboratory Last Values











WBC  9.4 K/mm3 (4.5-11.0)   11/22/17  05:35    


 


RBC  4.23 M/mm3 (3.65-5.03)   11/22/17  05:35    


 


Hgb  10.5 gm/dl (10.1-14.3)   11/22/17  05:35    


 


Hct  33.0 % (30.3-42.9)   11/22/17  05:35    


 


MCV  78 fl (79-97)  L  11/22/17  05:35    


 


MCH  25 pg (28-32)  L  11/22/17  05:35    


 


MCHC  32 % (30-34)   11/22/17  05:35    


 


RDW  16.3 % (13.2-15.2)  H  11/22/17  05:35    


 


Plt Count  174 K/mm3 (140-440)   11/22/17  05:35    


 


Lymph #  Np   11/21/17  13:48    


 


Add Manual Diff  Complete   11/21/17  13:48    


 


Total Counted  100   11/21/17  13:48    


 


Seg Neuts % (Manual)  63.0 % (40.0-70.0)   11/21/17  13:48    


 


Band Neutrophils %  0 %  11/21/17  13:48    


 


Lymphocytes % (Manual)  35.0 % (13.4-35.0)   11/21/17  13:48    


 


Reactive Lymphs % (Man)  0 %  11/21/17  13:48    


 


Monocytes % (Manual)  2.0 % (0.0-7.3)   11/21/17  13:48    


 


Eosinophils % (Manual)  0 % (0.0-4.3)   11/21/17  13:48    


 


Basophils % (Manual)  0 % (0.0-1.8)   11/21/17  13:48    


 


Metamyelocytes %  0 %  11/21/17  13:48    


 


Myelocytes %  0 %  11/21/17  13:48    


 


Promyelocytes %  0 %  11/21/17  13:48    


 


Blast Cells %  0 %  11/21/17  13:48    


 


Nucleated RBC %  Not Reportable   11/21/17  13:48    


 


Seg Neutrophils # Man  8.4 K/mm3 (1.8-7.7)  H  11/21/17  13:48    


 


Band Neutrophils #  0.0 K/mm3  11/21/17  13:48    


 


Lymphocytes # (Manual)  4.7 K/mm3 (1.2-5.4)   11/21/17  13:48    


 


Abs React Lymphs (Man)  0.0 K/mm3  11/21/17  13:48    


 


Monocytes # (Manual)  0.3 K/mm3 (0.0-0.8)   11/21/17  13:48    


 


Eosinophils # (Manual)  0.0 K/mm3 (0.0-0.4)   11/21/17  13:48    


 


Basophils # (Manual)  0.0 K/mm3 (0.0-0.1)   11/21/17  13:48    


 


Metamyelocytes #  0.0 K/mm3  11/21/17  13:48    


 


Myelocytes #  0.0 K/mm3  11/21/17  13:48    


 


Promyelocytes #  0.0 K/mm3  11/21/17  13:48    


 


Blast Cells #  0.0 K/mm3  11/21/17  13:48    


 


WBC Morphology  Not Reportable   11/21/17  13:48    


 


Hypersegmented Neuts  Not Reportable   11/21/17  13:48    


 


Hyposegmented Neuts  Not Reportable   11/21/17  13:48    


 


Hypogranular Neuts  Not Reportable   11/21/17  13:48    


 


Smudge Cells  Not Reportable   11/21/17  13:48    


 


Toxic Granulation  Not Reportable   11/21/17  13:48    


 


Toxic Vacuolation  Not Reportable   11/21/17  13:48    


 


Dohle Bodies  Not Reportable   11/21/17  13:48    


 


Pelger-Huet Anomaly  Not Reportable   11/21/17  13:48    


 


Jeanie Rods  Not Reportable   11/21/17  13:48    


 


Platelet Estimate  Cons   11/21/17  13:48    


 


Clumped Platelets  Not Reportable   11/21/17  13:48    


 


Plt Clumps, EDTA  Not Reportable   11/21/17  13:48    


 


Large Platelets  Not Reportable   11/21/17  13:48    


 


Giant Platelets  Not Reportable   11/21/17  13:48    


 


Platelet Satelliting  Not Reportable   11/21/17  13:48    


 


Plt Morphology Comment  Not Reportable   11/21/17  13:48    


 


RBC Morphology  Not Reportable   11/21/17  13:48    


 


Dimorphic RBCs  Not Reportable   11/21/17  13:48    


 


Polychromasia  Not Reportable   11/21/17  13:48    


 


Hypochromasia  1+   11/21/17  13:48    


 


Poikilocytosis  Not Reportable   11/21/17  13:48    


 


Anisocytosis  1+   11/21/17  13:48    


 


Microcytosis  Not Reportable   11/21/17  13:48    


 


Macrocytosis  Not Reportable   11/21/17  13:48    


 


Spherocytes  Not Reportable   11/21/17  13:48    


 


Pappenheimer Bodies  Not Reportable   11/21/17  13:48    


 


Sickle Cells  Not Reportable   11/21/17  13:48    


 


Target Cells  Not Reportable   11/21/17  13:48    


 


Tear Drop Cells  Not Reportable   11/21/17  13:48    


 


Ovalocytes  Not Reportable   11/21/17  13:48    


 


Helmet Cells  Not Reportable   11/21/17  13:48    


 


Judge-Munden Bodies  Not Reportable   11/21/17  13:48    


 


Cabot Rings  Not Reportable   11/21/17  13:48    


 


Boulder Cells  Not Reportable   11/21/17  13:48    


 


Bite Cells  Not Reportable   11/21/17  13:48    


 


Crenated Cell  Not Reportable   11/21/17  13:48    


 


Elliptocytes  Not Reportable   11/21/17  13:48    


 


Acanthocytes (Spur)  Not Reportable   11/21/17  13:48    


 


Rouleaux  Not Reportable   11/21/17  13:48    


 


Hemoglobin C Crystals  Not Reportable   11/21/17  13:48    


 


Schistocytes  Not Reportable   11/21/17  13:48    


 


Malaria parasites  Not Reportable   11/21/17  13:48    


 


Ayad Bodies  Not Reportable   11/21/17  13:48    


 


Hem Pathologist Commnt  No   11/21/17  13:48    


 


PT  12.5 Sec. (12.2-14.9)   11/21/17  15:50    


 


INR  0.89  (0.87-1.13)   11/21/17  15:50    


 


APTT  28.5 Sec. (24.2-36.6)   11/21/17  15:50    


 


Sodium  145 mmol/L (137-145)   11/22/17  05:35    


 


Potassium  3.4 mmol/L (3.6-5.0)  L  11/22/17  05:35    


 


Chloride  105.2 mmol/L ()   11/22/17  05:35    


 


Carbon Dioxide  28 mmol/L (22-30)   11/22/17  05:35    


 


Anion Gap  15 mmol/L  11/22/17  05:35    


 


BUN  6 mg/dL (7-17)  L  11/22/17  05:35    


 


Creatinine  0.5 mg/dL (0.7-1.2)  L  11/22/17  05:35    


 


Estimated GFR  > 60 ml/min  11/22/17  05:35    


 


BUN/Creatinine Ratio  12 %  11/22/17  05:35    


 


Glucose  111 mg/dL ()  H  11/22/17  05:35    


 


POC Glucose  183  ()  H  11/22/17  11:26    


 


Hemoglobin A1c  8.2 % (4-6)  H  11/21/17  13:48    


 


Calcium  8.2 mg/dL (8.4-10.2)  L  11/22/17  05:35    


 


Magnesium  1.80 mg/dL (1.7-2.3)   11/21/17  13:48    


 


Triglycerides  149 mg/dL (2-149)   11/22/17  05:35    


 


Cholesterol  146 mg/dL ()   11/22/17  05:35    


 


LDL Cholesterol Direct  88 mg/dL ()   11/22/17  05:35    


 


HDL Cholesterol  29 mg/dL (40-59)  L  11/22/17  05:35    


 


Cholesterol/HDL Ratio  5.03 %  11/22/17  05:35    


 


HCG, Qual  Negative  (Negative)   11/21/17  13:48

## 2017-11-23 VITALS — DIASTOLIC BLOOD PRESSURE: 75 MMHG | SYSTOLIC BLOOD PRESSURE: 153 MMHG

## 2017-11-23 RX ADMIN — ENOXAPARIN SODIUM SCH: 100 INJECTION SUBCUTANEOUS at 10:15

## 2017-11-23 RX ADMIN — INSULIN ASPART SCH: 100 INJECTION, SOLUTION INTRAVENOUS; SUBCUTANEOUS at 10:21

## 2017-11-23 RX ADMIN — CLOPIDOGREL BISULFATE SCH MG: 75 TABLET ORAL at 10:21

## 2017-11-23 RX ADMIN — GLIPIZIDE SCH MG: 5 TABLET, FILM COATED, EXTENDED RELEASE ORAL at 10:21

## 2017-11-23 NOTE — CONSULTATION
History of Present Illness


Consult date: 11/23/17


History of present illness: 


went over the studies ansd still need dictated report on the carotid u/s   the 

stroke is lacunar therefore ASA therapy, high dose statin, and HTN control   

there is no surgical lesion in the neck on the CTA of the neck  the ECHO does 

not show evidence of a source for embolus








Past History


Past Medical History: diabetes, other (asthma)


Past Surgical History: denies: No surgical history


Social history: lives with family (son), smoking (1 pack per day), alcohol abuse

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


Family history: CAD, diabetes





Medications and Allergies


 Allergies











Allergy/AdvReac Type Severity Reaction Status Date / Time


 


aspirin Allergy Unknown Unknown Verified 11/21/17 11:41











 Home Medications











 Medication  Instructions  Recorded  Confirmed  Last Taken  Type


 


AtorvaSTATin [Lipitor] 40 mg PO QHS #30 tablet 11/23/17  Unknown Rx


 


Clopidogrel [Plavix] 75 mg PO QDAY #30 tablet 11/23/17  Unknown Rx


 


Metformin HCl 1,000 mg PO BID #30 tablet 11/23/17  Unknown Rx


 


amLODIPine [Norvasc] 10 mg PO DAILY #30 tab 11/23/17  Unknown Rx


 


glipiZIDE [glipiZIDE ER] 5 mg PO DAILY #30 tab.er.24 11/23/17  Unknown Rx











Active Meds: 


Active Medications





Acetaminophen (Tylenol)  650 mg PO Q4H PRN


   PRN Reason: Pain, Mild (1-3)


Atorvastatin Calcium (Lipitor)  40 mg PO QHS [facility]


   Last Admin: 11/22/17 22:35 Dose:  40 mg


Bisacodyl (Dulcolax)  10 mg AK QDAY PRN


   PRN Reason: Constipation


Clopidogrel Bisulfate (Plavix)  75 mg PO QDAY [facility]


   Last Admin: 11/22/17 10:52 Dose:  75 mg


Dextrose (D50w (25gm) Vial)  25 gm IV PRN PRN


   PRN Reason: Hypoglycemia


Enoxaparin Sodium (Lovenox)  40 mg SUB-Q QDAY [facility]


   Last Admin: 11/22/17 10:52 Dose:  40 mg


Glipizide (Glucotrol Xl)  5 mg PO DAILY [facility]


   Last Admin: 11/22/17 10:52 Dose:  5 mg


Insulin Aspart (Novolog)  0 units SUB-Q [facility]


   PRN Reason: Protocol


   Last Admin: 11/22/17 17:30 Dose:  Not Given


Insulin Aspart (Novolog)  0 units SUB-Q QHS [facility]


   PRN Reason: Protocol


   Last Admin: 11/22/17 22:38 Dose:  Not Given


Magnesium Hydroxide (Milk Of Magnesia)  30 ml PO Q4H PRN


   PRN Reason: Constipation


Metoclopramide HCl (Reglan)  10 mg PO Q6H PRN


   PRN Reason: Nausea And Vomiting


Morphine Sulfate (Morphine)  2 mg IV Q4H PRN


   PRN Reason: Pain, Moderate (4-6)


Ondansetron HCl (Zofran)  4 mg IV Q6H PRN


   PRN Reason: nausea or vomiting


Sodium Chloride (Sodium Chloride Flush Syringe 10 Ml)  10 ml IV PRN PRN


   PRN Reason: LINE FLUSH











Physical Examination





- Vital Signs


Vital Signs: 


 Vital Signs











Temp Pulse Resp BP Pulse Ox


 


 98.0 F   71   16   157/110   99 


 


 11/21/17 11:23  11/21/17 11:23  11/21/17 11:23  11/21/17 11:23  11/21/17 11:23














Results





- Laboratory Findings


CBC and BMP: 


 11/22/17 05:35





 11/22/17 05:35


Abnormal Lab Findings: 


 Abnormal Labs











  11/21/17 11/21/17 11/21/17





  13:48 13:48 13:48


 


WBC  13.4 H  


 


MCV   


 


MCH  24 L  


 


RDW  16.7 H  


 


Seg Neutrophils # Man  8.4 H  


 


Potassium   3.5 L 


 


BUN   


 


Creatinine   0.4 L 


 


Glucose   


 


POC Glucose   


 


Hemoglobin A1c    8.2 H


 


Calcium   


 


HDL Cholesterol   














  11/21/17 11/21/17 11/22/17





  14:06 20:29 05:35


 


WBC   


 


MCV   


 


MCH   


 


RDW   


 


Seg Neutrophils # Man   


 


Potassium   


 


BUN   


 


Creatinine   


 


Glucose   


 


POC Glucose  131 H  137 H 


 


Hemoglobin A1c   


 


Calcium   


 


HDL Cholesterol    29 L














  11/22/17 11/22/17 11/22/17





  05:35 05:35 11:26


 


WBC   


 


MCV  78 L  


 


MCH  25 L  


 


RDW  16.3 H  


 


Seg Neutrophils # Man   


 


Potassium   3.4 L 


 


BUN   6 L 


 


Creatinine   0.5 L 


 


Glucose   111 H 


 


POC Glucose    183 H


 


Hemoglobin A1c   


 


Calcium   8.2 L 


 


HDL Cholesterol   














  11/22/17 11/22/17





  17:31 20:00


 


WBC  


 


MCV  


 


MCH  


 


RDW  


 


Seg Neutrophils # Man  


 


Potassium  


 


BUN  


 


Creatinine  


 


Glucose  


 


POC Glucose  108 H  204 H


 


Hemoglobin A1c  


 


Calcium  


 


HDL Cholesterol

## 2017-11-23 NOTE — DISCHARGE SUMMARY
Providers





- Providers


Date of Admission: 


11/21/17 16:09





Date of discharge: 11/23/17


Attending physician: 


AURA CALLAHAN MD





 





11/21/17 16:09


Consult to Case Management [CONS] Routine 


   Services Needed at Discharge: 


   Notified:: 


Occupational Therapy Evaluate and Treat [CONS] Routine 


   Comment: 


   Reason For Exam: Neuro deficits


Physical Therapy Evaluation and Treat [CONS] Routine 


   Comment: 


   Reason For Exam: Neuro deficits





11/21/17 19:59


Speech Therapy Evaluation and Treat [CONS] Routine 


   Reason For Exam: stroke





11/22/17 09:04


Consult to Physician [CONS] Routine 


   Consulting Provider: LYNDA FLOREZ


   Reason For Exam: left carotid 50-79%stenosis, left sided CVA


   Place consult to:: Dr. Florez


   Notified:: Carolyn ROSARIO


   Phone number called:: [phone]


   Was contact made?: Yes


   If yes, spoke with:: Kim-Office


   Time called:: 10:02





11/22/17 15:59


Consult to Physician [CONS] Routine 


   Consulting Provider: AYLEEN PETER


   Reason For Exam: CVA


   Place consult to:: Dr. Peter


   Notified:: Carolyn ROSARIO


   Phone number called:: [phone]


   Was contact made?: Yes


   If yes, spoke with:: Brock-answering service


   Time called:: 17:07











Primary care physician: 


PRIMARY CARE MD








Hospitalization


Reason for admission: CVA with right sided weakness


Condition: Stable


Pertinent studies: 


Brain MRI


Recent lacunar infarct is seen in the left corona radiata.  No evidence of 

hemorrhagic transformation is seen.


Carotid Doppler


50-79% stenosis of the left internal carotid artery


CT angiogram neck


No vertebral or carotid artery stenosis is seen in the neck.


Echo ejection fraction 55-60%





Hospital course: 





Patient is 37-year-old with history of diabetes, asthma and TIA.  She presents 

with right upper extremity numbness tingling and weakness for 2 days.  She 

states symptoms started yesterday when she had sudden numbness, tingling and 

weakness in the right upper extremity.  Initially she can still move however 

weakness became worse and today she cannot move right upper extremity at all. 

She therefore came to Emergency epartment for evaluation.  In ED,  CT head 

showed a possible acute ischemic stroke.  She will be admitted for further 

evaluation and management of acute ischemic stroke.  


   Patient was admitted for acute ischemic stroke and imaging is as stated 

above.  Patient was put on Plavix, statin and discharged home with home health.

  Patient's questions and concerns were addressed at the bedside.  Patient was 

hemodynamically stable at the time of discharge.


Disposition: [id number] HOME UNDER HOME [facility]


Time spent for discharge: 31 minutes





- Discharge Diagnoses


(1) Acute ischemic stroke


Status: Acute   





(2) Acute lacunar infarction


Status: Acute   





(3) Diabetes


Status: Acute   





(4) Hypertension


Status: Acute   





(5) Right sided weakness


Status: Acute   





Core Measure Documentation





- Palliative Care


Palliative Care/ Comfort Measures: Not Applicable





- Core Measures


Any of the following diagnoses?: stroke





- Stroke Discharge Requirements


Statin for LDL = or >70 mg/dl on DC: Yes


Anticoag for atrial fib/atrial flutter: Not Applicable


Antithrombotic for ischemic stroke: Yes





Exam





- Physical Exam


Narrative exam: 


 Not in cardiopulmonary distress. 


 The patient appeared well nourished and normally developed.


 Vital signs as documented.


 Head exam is unremarkable.


 No scleral icterus .


 Neck is without jugular venous distension, thyromegaly, or carotid bruits. 


 Lungs are clear to auscultation.


Cardiac exam reveals regular rate and  Rhythm. First and second heart sounds 

normal. No murmurs, rubs or gallops. 


Abdominal exam reveals normal bowel sounds, no masses, no organomegaly and no 

aortic enlargement. 


Extremities are nonedematous and both femoral and pedal pulses are normal.


CNS: Alert and oriented 3.  Right upper extremity mild weakness.








- Constitutional


Vitals: 


 











Temp Pulse Resp BP Pulse Ox


 


 97.9 F   61   18   153/75   98 


 


 11/23/17 05:10  11/23/17 05:10  11/23/17 05:10  11/23/17 05:10  11/23/17 05:10














Plan


Activity: no restrictions


Weight Bearing Status: Full Weight Bearing


Diet: low cholesterol, low salt, diabetic


Additional Inst[facility]: [facility]


Follow up with: 


[facility] [Provider Group] - 7 Days


PRIMARY CARE,MD [Primary Care Provider] - 3-5 Days


AYLEEN PETER MD [Staff Physician] - 14 Days


Prescriptions: 


AtorvaSTATin [Lipitor] 40 mg PO QHS #30 tablet


amLODIPine [Norvasc] 10 mg PO DAILY #30 tab


Clopidogrel [Plavix] 75 mg PO QDAY #30 tablet


glipiZIDE [glipiZIDE ER] 5 mg PO DAILY #30 tab.er.24


Metformin HCl 1,000 mg PO BID #30 tablet

## 2018-01-10 ENCOUNTER — HOSPITAL ENCOUNTER (EMERGENCY)
Dept: HOSPITAL 5 - ED | Age: 38
Discharge: HOME | End: 2018-01-10
Payer: SELF-PAY

## 2018-01-10 VITALS — DIASTOLIC BLOOD PRESSURE: 83 MMHG | SYSTOLIC BLOOD PRESSURE: 139 MMHG

## 2018-01-10 DIAGNOSIS — F17.200: ICD-10-CM

## 2018-01-10 DIAGNOSIS — Z88.6: ICD-10-CM

## 2018-01-10 DIAGNOSIS — E11.9: ICD-10-CM

## 2018-01-10 DIAGNOSIS — R53.1: Primary | ICD-10-CM

## 2018-01-10 LAB
APTT BLD: 26.8 SEC. (ref 24.2–36.6)
BASOPHILS # (AUTO): 0 K/MM3 (ref 0–0.1)
BASOPHILS NFR BLD AUTO: 0.1 % (ref 0–1.8)
BUN SERPL-MCNC: 7 MG/DL (ref 7–17)
BUN/CREAT SERPL: 14 %
CALCIUM SERPL-MCNC: 8.6 MG/DL (ref 8.4–10.2)
EOSINOPHIL # BLD AUTO: 0.2 K/MM3 (ref 0–0.4)
EOSINOPHIL NFR BLD AUTO: 2.5 % (ref 0–4.3)
HCT VFR BLD CALC: 32.5 % (ref 30.3–42.9)
HEMOLYSIS INDEX: 9
HGB BLD-MCNC: 10.6 GM/DL (ref 10.1–14.3)
INR PPP: 0.86 (ref 0.87–1.13)
LYMPHOCYTES # BLD AUTO: 3.8 K/MM3 (ref 1.2–5.4)
LYMPHOCYTES NFR BLD AUTO: 39.7 % (ref 13.4–35)
MCH RBC QN AUTO: 26 PG (ref 28–32)
MCHC RBC AUTO-ENTMCNC: 33 % (ref 30–34)
MCV RBC AUTO: 78 FL (ref 79–97)
MONOCYTES # (AUTO): 0.4 K/MM3 (ref 0–0.8)
MONOCYTES % (AUTO): 4.4 % (ref 0–7.3)
PLATELET # BLD: 206 K/MM3 (ref 140–440)
RBC # BLD AUTO: 4.16 M/MM3 (ref 3.65–5.03)

## 2018-01-10 PROCEDURE — 85025 COMPLETE CBC W/AUTO DIFF WBC: CPT

## 2018-01-10 PROCEDURE — 84484 ASSAY OF TROPONIN QUANT: CPT

## 2018-01-10 PROCEDURE — 85610 PROTHROMBIN TIME: CPT

## 2018-01-10 PROCEDURE — 99285 EMERGENCY DEPT VISIT HI MDM: CPT

## 2018-01-10 PROCEDURE — 93010 ELECTROCARDIOGRAM REPORT: CPT

## 2018-01-10 PROCEDURE — 80048 BASIC METABOLIC PNL TOTAL CA: CPT

## 2018-01-10 PROCEDURE — 93005 ELECTROCARDIOGRAM TRACING: CPT

## 2018-01-10 PROCEDURE — 85730 THROMBOPLASTIN TIME PARTIAL: CPT

## 2018-01-10 PROCEDURE — 70450 CT HEAD/BRAIN W/O DYE: CPT

## 2018-01-10 PROCEDURE — 85670 THROMBIN TIME PLASMA: CPT

## 2018-01-10 PROCEDURE — 36415 COLL VENOUS BLD VENIPUNCTURE: CPT

## 2018-01-10 NOTE — EVENT NOTE
Date: 01/10/18


Patient seen and evaluated


Patient had CVA with rt side weakness in Nov 2017.


Recovered completely.Had some weakness today on Rt side which lasted for a few 

minutes


Has recovered completely.


Dis Dx TIA


Patient to be compliant with plavix and Statins


F/u with PCp and Neuro as outpatient.


To come back if symptoms recur.

## 2018-01-10 NOTE — EMERGENCY DEPARTMENT REPORT
ED Neuro Deficit HPI





- General


Chief Complaint: Neuro Symptoms/Deficit


Stated Complaint: TIA


Time Seen by Provider: 01/10/18 11:47


Source: patient, EMS


Mode of arrival: Stretcher


Limitations: No Limitations





- History of Present Illness


Initial Comments: 





Patient is a 37-year-old  female with past medical history of 

CVA with some mild right sided weakness at baseline who is presenting with 

increased right upper and lower extremity weakness today.  Patient states this 

morning several hours before her arrival here in the emergency department she 

became unable to lift her right arm.  Patient also has slurred speech at the 

time.  Patient did not look at her face to see if there is any facial drooping.

  The symptoms occurred approximately for 1 hour.  Patient states the symptoms 

completely have resolved and she is back at her baseline.  Patient denies chest 

pain shortness of breath nausea vomiting diarrhea fever chills at this time.  

Patient states that she is not taking any current medications which she has run 

out of her medications and does not know the name





- Related Data


Home Medications: 


 Previous Rx's











 Medication  Instructions  Recorded  Last Taken  Type


 


AtorvaSTATin [Lipitor] 40 mg PO QHS #30 tablet 11/23/17 Unknown Rx


 


Clopidogrel [Plavix] 75 mg PO QDAY #30 tablet 11/23/17 Unknown Rx


 


Metformin HCl 1,000 mg PO BID #30 tablet 11/23/17 Unknown Rx


 


amLODIPine [Norvasc] 10 mg PO DAILY #30 tab 11/23/17 Unknown Rx


 


glipiZIDE [glipiZIDE ER] 5 mg PO DAILY #30 tab.er.24 11/23/17 Unknown Rx











Allergies/Adverse Reactions: 


 Allergies











Allergy/AdvReac Type Severity Reaction Status Date / Time


 


aspirin Allergy Unknown Unknown Verified 11/21/17 11:41














ED Review of Systems


ROS: 


Stated complaint: TIA


Other details as noted in HPI





Comment: All other systems reviewed and negative





ED Past Medical Hx





- Past Medical History


Hx Diabetes: Yes


Hx Asthma: Yes





- Social History


Smoking Status: Current Every Day Smoker





- Medications


Home Medications: 


 Home Medications











 Medication  Instructions  Recorded  Confirmed  Last Taken  Type


 


AtorvaSTATin [Lipitor] 40 mg PO QHS #30 tablet 11/23/17 01/10/18 Unknown Rx


 


Clopidogrel [Plavix] 75 mg PO QDAY #30 tablet 11/23/17 01/10/18 Unknown Rx


 


Metformin HCl 1,000 mg PO BID #30 tablet 11/23/17 01/10/18 Unknown Rx


 


amLODIPine [Norvasc] 10 mg PO DAILY #30 tab 11/23/17 01/10/18 Unknown Rx


 


glipiZIDE [glipiZIDE ER] 5 mg PO DAILY #30 tab.er.24 11/23/17 01/10/18 Unknown 

Rx














ED Neuro Physical Exam





- General


Limitations: No Limitations


General appearance: alert, in no apparent distress





- Head


Head exam: Present: atraumatic, normocephalic





- Eye


Eye exam: Present: normal appearance





- ENT


ENT exam: Present: mucous membranes moist





- Neck


Neck exam: Present: normal inspection





- Respiratory


Respiratory exam: Present: normal lung sounds bilaterally.  Absent: respiratory 

distress





- Cardiovascular


Cardiovascular Exam: Present: regular rate, normal rhythm.  Absent: systolic 

murmur, diastolic murmur, rubs, gallop





- GI/Abdominal


GI/Abdominal exam: Present: soft, normal bowel sounds





- Extremities Exam


Extremities exam: Present: normal inspection





- Back Exam


Back exam: Present: normal inspection





- Neurological Exam


Neurological exam: Present: alert, oriented X3





- NIHSS


Assessment Interval: Baseline


1a. Level of Consciousness: alert


1b. LOC Questions: answers correctly


1c. LOC Commands: performs tasks correctly


2. Best Gaze: normal


3. Visual: no visual loss


4. Facial Palsy: normal symmetrical movement


5b. Motor Arm Right: drift


5a. Motor Arm Left: no drift


6a. Motor Leg Left: no drift


6b. Motor Leg Right: no drift


7. Limb Ataxia: absent


8. Sensory: normal


9. Best Language: no aphasia


10. Dysarthria: normal


11. Extinction/Inattention: no abnormality


Total Score: 1


Stroke Severity: Minor Stroke





- Psychiatric


Psychiatric exam: Present: normal affect, normal mood





- Skin


Skin exam: Present: warm, dry, intact, normal color.  Absent: rash





ED Course





 Vital Signs











  01/10/18 01/10/18





  11:56 13:06


 


Temperature 98.2 F 


 


Pulse Rate 78 


 


Respiratory 16 16





Rate  


 


Blood Pressure 134/79 





[Right]  


 


O2 Sat by Pulse 100 100





Oximetry  














- Lab Data


Result diagrams: 


 01/10/18 12:14





 01/10/18 12:14





 Lab Results











  01/10/18 01/10/18 01/10/18 Range/Units





  12:14 12:14 12:14 


 


WBC  9.5    (4.5-11.0)  K/mm3


 


RBC  4.16    (3.65-5.03)  M/mm3


 


Hgb  10.6    (10.1-14.3)  gm/dl


 


Hct  32.5    (30.3-42.9)  %


 


MCV  78 L    (79-97)  fl


 


MCH  26 L    (28-32)  pg


 


MCHC  33    (30-34)  %


 


RDW  17.6 H    (13.2-15.2)  %


 


Plt Count  206    (140-440)  K/mm3


 


Lymph % (Auto)  39.7 H    (13.4-35.0)  %


 


Mono % (Auto)  4.4    (0.0-7.3)  %


 


Eos % (Auto)  2.5    (0.0-4.3)  %


 


Baso % (Auto)  0.1    (0.0-1.8)  %


 


Lymph #  3.8    (1.2-5.4)  K/mm3


 


Mono #  0.4    (0.0-0.8)  K/mm3


 


Eos #  0.2    (0.0-0.4)  K/mm3


 


Baso #  0.0    (0.0-0.1)  K/mm3


 


Seg Neutrophils %  53.3    (40.0-70.0)  %


 


Seg Neutrophils #  5.0    (1.8-7.7)  K/mm3


 


PT   12.1 L   (12.2-14.9)  Sec.


 


INR   0.86 L   (0.87-1.13)  


 


APTT   26.8   (24.2-36.6)  Sec.


 


Thrombin Time     (15.1-19.6)  Sec.


 


Sodium    140  (137-145)  mmol/L


 


Potassium    3.8  (3.6-5.0)  mmol/L


 


Chloride    101.4  ()  mmol/L


 


Carbon Dioxide    25  (22-30)  mmol/L


 


Anion Gap    17  mmol/L


 


BUN    7  (7-17)  mg/dL


 


Creatinine    0.5 L  (0.7-1.2)  mg/dL


 


Estimated GFR    > 60  ml/min


 


BUN/Creatinine Ratio    14  %


 


Glucose    232 H  ()  mg/dL


 


Calcium    8.6  (8.4-10.2)  mg/dL


 


Troponin T    < 0.010  (0.00-0.029)  ng/mL














  01/10/18 Range/Units





  12:14 


 


WBC   (4.5-11.0)  K/mm3


 


RBC   (3.65-5.03)  M/mm3


 


Hgb   (10.1-14.3)  gm/dl


 


Hct   (30.3-42.9)  %


 


MCV   (79-97)  fl


 


MCH   (28-32)  pg


 


MCHC   (30-34)  %


 


RDW   (13.2-15.2)  %


 


Plt Count   (140-440)  K/mm3


 


Lymph % (Auto)   (13.4-35.0)  %


 


Mono % (Auto)   (0.0-7.3)  %


 


Eos % (Auto)   (0.0-4.3)  %


 


Baso % (Auto)   (0.0-1.8)  %


 


Lymph #   (1.2-5.4)  K/mm3


 


Mono #   (0.0-0.8)  K/mm3


 


Eos #   (0.0-0.4)  K/mm3


 


Baso #   (0.0-0.1)  K/mm3


 


Seg Neutrophils %   (40.0-70.0)  %


 


Seg Neutrophils #   (1.8-7.7)  K/mm3


 


PT   (12.2-14.9)  Sec.


 


INR   (0.87-1.13)  


 


APTT   (24.2-36.6)  Sec.


 


Thrombin Time  16.3  (15.1-19.6)  Sec.


 


Sodium   (137-145)  mmol/L


 


Potassium   (3.6-5.0)  mmol/L


 


Chloride   ()  mmol/L


 


Carbon Dioxide   (22-30)  mmol/L


 


Anion Gap   mmol/L


 


BUN   (7-17)  mg/dL


 


Creatinine   (0.7-1.2)  mg/dL


 


Estimated GFR   ml/min


 


BUN/Creatinine Ratio   %


 


Glucose   ()  mg/dL


 


Calcium   (8.4-10.2)  mg/dL


 


Troponin T   (0.00-0.029)  ng/mL











Critical care attestation.: 


If time is entered above; I have spent that time in minutes in the direct care 

of this critically ill patient, excluding procedure time.








ED Disposition


Condition: Stable


Referrals: 


PRIMARY CARE,MD [Primary Care Provider] - 3-5 Days

## 2018-01-10 NOTE — CAT SCAN REPORT
CT HEAD WITHOUT CONTRAST:



HISTORY:  Neurological deficit.



TECHNIQUE:  Sequential 2.5mm CT images.



COMPARISON: 11/21/17.



FINDINGS:



Cerebral Parenchyma: A 6 x 11 mm chronic focal infarct is identified in 

the left coronal radiata which has evolved since the previous CT 

performed on 11/17/17. The remaining brain parenchyma demonstrates 

normal attenuation on today's exam. No new areas of ischemia are 

appreciated on noncontrast CT.



Cerebellum:  Within normal limits.



Brainstem:  Within normal limits.



Ventricles: Normal.



Sella:  Normal.



Extra-axial spaces:  Normal.



Basal Cisterns:  Normal.



Intracranial Hemorrhage:  None.



Midline Shift:  None.



Calvarium: Normal.



Sinuses: Normal.



Mastoid Air Cells:  Normal.



Visualized Orbits:  Normal.







IMPRESSION:

No acute intracranial process is identified. Chronic focal infarct in 

the left corona radiata as described.

## 2020-06-25 ENCOUNTER — HOSPITAL ENCOUNTER (INPATIENT)
Dept: HOSPITAL 5 - ED | Age: 40
LOS: 5 days | Discharge: HOME HEALTH | DRG: 853 | End: 2020-06-30
Attending: INTERNAL MEDICINE | Admitting: INTERNAL MEDICINE
Payer: COMMERCIAL

## 2020-06-25 DIAGNOSIS — R65.20: ICD-10-CM

## 2020-06-25 DIAGNOSIS — I69.354: ICD-10-CM

## 2020-06-25 DIAGNOSIS — Z82.5: ICD-10-CM

## 2020-06-25 DIAGNOSIS — E11.9: ICD-10-CM

## 2020-06-25 DIAGNOSIS — J45.909: ICD-10-CM

## 2020-06-25 DIAGNOSIS — D72.829: ICD-10-CM

## 2020-06-25 DIAGNOSIS — A41.9: Primary | ICD-10-CM

## 2020-06-25 DIAGNOSIS — E87.2: ICD-10-CM

## 2020-06-25 DIAGNOSIS — I10: ICD-10-CM

## 2020-06-25 DIAGNOSIS — Z82.49: ICD-10-CM

## 2020-06-25 DIAGNOSIS — M72.6: ICD-10-CM

## 2020-06-25 DIAGNOSIS — E87.6: ICD-10-CM

## 2020-06-25 DIAGNOSIS — D72.825: ICD-10-CM

## 2020-06-25 DIAGNOSIS — D64.9: ICD-10-CM

## 2020-06-25 DIAGNOSIS — F17.210: ICD-10-CM

## 2020-06-25 DIAGNOSIS — Z88.6: ICD-10-CM

## 2020-06-25 DIAGNOSIS — K61.1: ICD-10-CM

## 2020-06-25 PROCEDURE — 86901 BLOOD TYPING SEROLOGIC RH(D): CPT

## 2020-06-25 PROCEDURE — 80202 ASSAY OF VANCOMYCIN: CPT

## 2020-06-25 PROCEDURE — 87040 BLOOD CULTURE FOR BACTERIA: CPT

## 2020-06-25 PROCEDURE — 82140 ASSAY OF AMMONIA: CPT

## 2020-06-25 PROCEDURE — A4217 STERILE WATER/SALINE, 500 ML: HCPCS

## 2020-06-25 PROCEDURE — 80048 BASIC METABOLIC PNL TOTAL CA: CPT

## 2020-06-25 PROCEDURE — 86850 RBC ANTIBODY SCREEN: CPT

## 2020-06-25 PROCEDURE — 85025 COMPLETE CBC W/AUTO DIFF WBC: CPT

## 2020-06-25 PROCEDURE — 83036 HEMOGLOBIN GLYCOSYLATED A1C: CPT

## 2020-06-25 PROCEDURE — 72193 CT PELVIS W/DYE: CPT

## 2020-06-25 PROCEDURE — A6260 WOUND CLEANSER ANY TYPE/SIZE: HCPCS

## 2020-06-25 PROCEDURE — 86900 BLOOD TYPING SEROLOGIC ABO: CPT

## 2020-06-25 PROCEDURE — 87075 CULTR BACTERIA EXCEPT BLOOD: CPT

## 2020-06-25 PROCEDURE — 87116 MYCOBACTERIA CULTURE: CPT

## 2020-06-25 PROCEDURE — 86920 COMPATIBILITY TEST SPIN: CPT

## 2020-06-25 PROCEDURE — 85007 BL SMEAR W/DIFF WBC COUNT: CPT

## 2020-06-25 PROCEDURE — 36415 COLL VENOUS BLD VENIPUNCTURE: CPT

## 2020-06-25 PROCEDURE — 85610 PROTHROMBIN TIME: CPT

## 2020-06-25 PROCEDURE — 82962 GLUCOSE BLOOD TEST: CPT

## 2020-06-25 PROCEDURE — 84703 CHORIONIC GONADOTROPIN ASSAY: CPT

## 2020-06-26 LAB
%HYPO/RBC NFR BLD AUTO: (no result) %
BAND NEUTROPHILS # (MANUAL): 10 K/MM3
BUN SERPL-MCNC: 19 MG/DL (ref 7–17)
BUN/CREAT SERPL: 16 %
CALCIUM SERPL-MCNC: 8.8 MG/DL (ref 8.4–10.2)
HCT VFR BLD CALC: 27.1 % (ref 30.3–42.9)
HEMOLYSIS INDEX: 2
HGB BLD-MCNC: 8.7 GM/DL (ref 10.1–14.3)
MCHC RBC AUTO-ENTMCNC: 32 % (ref 30–34)
MCV RBC AUTO: 71 FL (ref 79–97)
MYELOCYTES # (MANUAL): 0 K/MM3
PLATELET # BLD: 169 K/MM3 (ref 140–440)
PROMYELOCYTES # (MANUAL): 0 K/MM3
RBC # BLD AUTO: 3.82 M/MM3 (ref 3.65–5.03)
TOTAL CELLS COUNTED BLD: 100

## 2020-06-26 PROCEDURE — 0JB90ZZ EXCISION OF BUTTOCK SUBCUTANEOUS TISSUE AND FASCIA, OPEN APPROACH: ICD-10-PCS | Performed by: SURGERY

## 2020-06-26 RX ADMIN — INSULIN LISPRO SCH UNIT: 100 INJECTION, SOLUTION INTRAVENOUS; SUBCUTANEOUS at 17:27

## 2020-06-26 RX ADMIN — PIPERACILLIN AND TAZOBACTAM SCH MLS/HR: 4; .5 INJECTION, POWDER, FOR SOLUTION INTRAVENOUS at 06:02

## 2020-06-26 RX ADMIN — METRONIDAZOLE SCH MLS/HR: 5 INJECTION, SOLUTION INTRAVENOUS at 14:34

## 2020-06-26 RX ADMIN — Medication SCH ML: at 22:39

## 2020-06-26 RX ADMIN — PIPERACILLIN AND TAZOBACTAM SCH MLS/HR: 4; .5 INJECTION, POWDER, FOR SOLUTION INTRAVENOUS at 16:30

## 2020-06-26 RX ADMIN — Medication SCH ML: at 14:36

## 2020-06-26 RX ADMIN — MORPHINE SULFATE PRN MG: 2 INJECTION, SOLUTION INTRAMUSCULAR; INTRAVENOUS at 05:50

## 2020-06-26 RX ADMIN — SODIUM CHLORIDE SCH MLS/HR: 0.9 INJECTION, SOLUTION INTRAVENOUS at 06:02

## 2020-06-26 RX ADMIN — INSULIN LISPRO SCH UNIT: 100 INJECTION, SOLUTION INTRAVENOUS; SUBCUTANEOUS at 08:54

## 2020-06-26 RX ADMIN — INSULIN LISPRO SCH: 100 INJECTION, SOLUTION INTRAVENOUS; SUBCUTANEOUS at 08:00

## 2020-06-26 RX ADMIN — PIPERACILLIN AND TAZOBACTAM SCH MLS/HR: 4; .5 INJECTION, POWDER, FOR SOLUTION INTRAVENOUS at 22:44

## 2020-06-26 RX ADMIN — CLINDAMYCIN IN 5 PERCENT DEXTROSE SCH MLS/HR: 12 INJECTION, SOLUTION INTRAVENOUS at 22:39

## 2020-06-26 RX ADMIN — INSULIN LISPRO SCH UNIT: 100 INJECTION, SOLUTION INTRAVENOUS; SUBCUTANEOUS at 22:38

## 2020-06-26 RX ADMIN — SODIUM CHLORIDE SCH MLS/HR: 0.9 INJECTION, SOLUTION INTRAVENOUS at 14:40

## 2020-06-26 RX ADMIN — METRONIDAZOLE SCH MLS/HR: 5 INJECTION, SOLUTION INTRAVENOUS at 06:02

## 2020-06-26 NOTE — ANESTHESIA CONSULTATION
Anesthesia Consult and Med Hx


Date of service: 06/26/20





- Airway


Anesthetic Teeth Evaluation: Dentures (upper), Partials (lower)


ROM Head & Neck: Adequate


Mental/Hyoid Distance: Adequate


Mallampati Class: Class II


Intubation Access Assessment: Probably Good





- Pulmonary Exam


CTA: Yes





- Cardiac Exam


Cardiac Exam: RRR





- Pre-Operative Health Status


ASA Pre-Surgery Classification: ASA3, Emergency


Proposed Anesthetic Plan: General





- Pulmonary


Hx Smoking: Yes (1/2 PPD)


Hx Asthma: Yes (no exacerbation or inhaler use in several years)


Hx Respiratory Symptoms: No


Hx Sleep Apnea: No





- Cardiovascular System


Hx Hypertension: Yes


Hx Heart Attack/AMI: No


Hx Percutaneous Transluminal Coronary Angioplasty (PTCA): No


Hx Cardia Arrhythmia: No





- Central Nervous System


CVA: Yes (2011 w/ residual right sided weakness)





- Gastrointestinal


Hx Gastroesophageal Reflux Disease: No





- Endocrine


Hx Renal Disease: No


Hx Liver Disease: No


Hx Non-Insulin Dependent Diabetes: Yes (poorly controlled)


Hx Thyroid Disease: No





- Hematic


Hx Anemia: Yes





- Other Systems


Hx Substance Use: Yes (THC)


Hx Obesity: No (BMI 28)





- Additional Comments


Anesthesia Medical History Comments: No prior GA. No FHx anesthetic 

complications. PMH DM, HTN, prior CVA smoking presenting scheduled for 

debridement of perianal infection. HD stable with mild tachycardia.

## 2020-06-26 NOTE — OPERATIVE REPORT
Operative Report


Operative Report: 





Date: 06/26/20 13:03                                                            





Pre-op diagnosis: necrotizing fasciitis buttocks


Post-op diagnosis: same


Findings: 





Extensive necrotizing fasciitis of right buttock and portion of left buttock. 

Undermining circumfrentially on right. Wounds 2-3 cm from anus.





Left side: 4cm x 2.5cm x 2cm


R side: 13cm x 7cm x 5cm 





Procedure: 





Excisional debridement of necrotizing fasciitis bilateral buttocks


Anesthesia: GETA, local


Surgeon: ANN VALE


Estimated blood loss: 50-100ml


Pathology: list (wound cultures)


Specimen disposition: to lab


Condition: stable


Disposition: PACU





HPI and indication: Pt is a 41 yo F with hx of DM who presented to ER with 

severe pain and swelling of her right buttock which she noticed 5 days prior. 

She was admitted to the hospitalist service for buttock abscess. Surgical 

evaluation was requested and on exam the patient was noted to have erythema, 

necrosis of skin, and fluctuance consistent with necrotizing fasciitis. CT scan 

pelvis was reviewed and showed subcutaneous air in the buttock. Recommendation 

was made for emergent incision, drainage, debridement of necrotizing fasciitis 

of buttocks/perineum and consent obtained from patient. All risks, benefits, 

alternatives to surgery were discussed.





Procedure in detail: The patient was identified in the preoperative area and 

taken back to the operating room.  Anesthesia was induced on the hospital bed 

and after the endotracheal tube was secured, the patient was placed on the 

operating room table in prone position.  All bony prominences were padded 

appropriately.  The buttocks were taped apart and the buttocks and perineum area

and perirectal area were prepped and draped in usual sterile fashion.  A timeout

was performed.  On examination there was severe erythema, fluctuance, induration

of the entire right inner buttock as well as the lower portion of the left inner

buttock.  At the center of this erythema on the right, there was a necrotic area

of skin measuring 2 cm x 1 cm.  On the left there was a necrotic area of skin 

measuring 1 cm x 1 cm.





I started by debriding the right side.  The area of necrotic skin was excised 

using forceps and a 10 blade.  Once excised, there was a copious amount of pus, 

gray necrotic subcutaneous tissue, very foul-smelling.  Wound cultures were 

obtained.  There was circumferential undermining and the wound extended deep 

into the gluteal subcutaneous tissue.  The entire cavity was unroofed by 

excising more skin using a 10 blade and forceps.  At this point I performed an 

excisional debridement of the necrotic skin and subcutaneous tissue to the 

fascia overlying the muscle using forceps and a curved Fernandez scissors.  There 

were multiple deep tunnels.  The wound extended towards the perineum and 

cephalad towards the lower back.  Once all of the necrotic tissue and pus was 

evacuated, the wound was irrigated with pulse lavage.  There was oozing from the

entire wound bed.  Hemostasis was very carefully ensured using a combination of 

pressure and electrocautery.  This wound measured 13 cm x 7 cm x 5 cm.  The 

wound was then packed with 1 lap pad.





I then proceeded to perform a similar debridement on the left buttock by first 

excising the necrotic skin using a 10 blade.  Additional excisional debridement 

of necrotic skin and subcutaneous tissue was undertaken with forceps and 

Metzenbaum scissors, down to the fascia.  There was generalized oozing from the 

healthy subcutaneous fat.  The wound measured 4 cm x 2.5 cm x 2 cm.  The wound 

was then irrigated with pulse lavage. Hemostasis was carefully ensured using a 

combination of pressure and electrocautery. 





The lap pad was then removed from the right side.  The wounds were once again 

checked for hemostasis which was carefully ensured.  Both wounds were packed 

with 1 piece each of Dakin's moistened Kerlix.  This was covered with 4 x 4 

gauze ABD pads and tape.





At the end of the case, all sponge, instrument, sharp counts were correct x2.  

Patient was transferred to a hospital bed, awoken from anesthesia, extubated, 

taken to PACU in stable condition.

## 2020-06-26 NOTE — CONSULTATION
History of Present Illness


Consult date: 06/26/20


Chief complaint: 





Buttock abscess





- History of present illness


History of present illness: 





40-year-old female with a past medical history of diabetes, hypertension, asthma

who presented to the emergency room with pain of her right buttock and swelling.

 The patient states she first noticed a boil in that area 5 days ago and the day

after she noticed that she it had spread significantly.  She states yesterday 

she could not tolerate the pain anymore and decided to come to the emergency 

room.  She reports subjective fevers.  No drainage from the area.  She has never

had anything like this in the past.  She states her last hemoglobin A1c was 8.





Past History


Past Medical History: diabetes, hypertension, stroke, other (TIA, Asthma)


Past Surgical History: No surgical history


Social history: single, Lives alone, smoking (1 pack per 2-3  days, daily 

marijuana use), other (social ETOH use)


Family history: hypertension, other (COPD, CHF, asthma)





Medications and Allergies


                                    Allergies











Allergy/AdvReac Type Severity Reaction Status Date / Time


 


aspirin Allergy Unknown Unknown Verified 11/21/17 11:41











                                Home Medications











 Medication  Instructions  Recorded  Confirmed  Last Taken  Type


 


AtorvaSTATin [Lipitor] 40 mg PO QHS #30 tablet 01/10/18  Unknown Rx


 


Clopidogrel [Plavix] 75 mg PO QDAY #30 tablet 01/10/18  Unknown Rx


 


Metformin HCl [metFORMIN] 1,000 mg PO BID #60 tablet 01/10/18 01/10/18 Unknown 

Rx


 


amLODIPine 10 mg PO DAILY #30 tab 01/10/18  Unknown Rx


 


glipiZIDE [glipiZIDE ER] 5 mg PO DAILY 60 Days #30 tab.er.24 01/10/18  Unknown 

Rx











Active Meds: 


Active Medications





Acetaminophen (Tylenol)  650 mg PO Q4H PRN


   PRN Reason: Pain MILD(1-3)/Fever >100.5/HA


Dextrose (D50w (25gm) Syringe)  0 ml IV Q30MIN PRN; Protocol


   PRN Reason: Hypoglycemia


Sodium Chloride (Nacl 0.9% 1000 Ml)  1,000 mls @ 125 mls/hr IV AS DIRECT JACKIE


   Last Admin: 06/26/20 06:02 Dose:  125 mls/hr


   Documented by: 


Piperacillin Sod/Tazobactam Sod (Zosyn/Ns 4.5gm/100ml)  4.5 gm in 100 mls @ 200 

mls/hr IV Q8HR JACKIE; Protocol


   Last Infusion: 06/26/20 07:21 Dose:  Infused


   Documented by: 


Metronidazole (Flagyl 500 Mg/100 Ml)  500 mg in 100 mls @ 100 mls/hr IV Q8HR 

JACKIE; Protocol


   Last Infusion: 06/26/20 07:21 Dose:  Infused


   Documented by: 


Insulin Human Lispro (Humalog)  0 unit SUB-Q ACHS JACKIE; Protocol


   Last Admin: 06/26/20 08:54 Dose:  6 unit


   Documented by: 


Morphine Sulfate (Morphine)  2 mg IV Q4H PRN


   PRN Reason: Pain, Moderate (4-6)


   Last Admin: 06/26/20 05:50 Dose:  2 mg


   Documented by: 


Ondansetron HCl (Zofran)  4 mg IV Q8H PRN


   PRN Reason: Nausea And Vomiting


Sodium Chloride (Sodium Chloride Flush Syringe 10 Ml)  10 ml IV BID JACKIE


Sodium Chloride (Sodium Chloride Flush Syringe 10 Ml)  10 ml IV PRN PRN


   PRN Reason: LINE FLUSH











Review of Systems


All systems: negative (10 point review of systems was performed negative except 

for that listed in HPI)





Exam


                                   Vital Signs











Temp Pulse Resp BP Pulse Ox


 


 98.1 F   114 H  18   113/56   96 


 


 06/25/20 23:41  06/25/20 23:41  06/25/20 23:41  06/25/20 23:41  06/25/20 23:41











Narrative exam: 





Gen.: Awake, alert, oriented 3.  No apparent distress


ENT: Trachea midline.  No lymphadenopathy.  No scleral icterus or conjunctival 

pallor


CV: S1, S2 present


Respiratory: No audible wheezes


Extremities: No clubbing, cyanosis, edema


Skin: There is significant erythema and cellulitic changes of the right inner 

buttock with a central area of necrotic skin.  There is no drainage.  There is 

tenderness to palpation.





Results





- Labs





                                 06/26/20 00:21





                                 06/26/20 00:21


                              Abnormal lab results











  06/26/20 06/26/20 06/26/20 Range/Units





  00:21 00:21 00:21 


 


WBC  38.5 H    (4.5-11.0)  K/mm3


 


Hgb  8.7 L    (10.1-14.3)  gm/dl


 


Hct  27.1 L    (30.3-42.9)  %


 


MCV  71 L    (79-97)  fl


 


MCH  23 L    (28-32)  pg


 


RDW  18.3 H    (13.2-15.2)  %


 


Lymphocytes % (Manual)  2.0 L    (13.4-35.0)  %


 


Seg Neutrophils # Man  26.6 H    (1.8-7.7)  K/mm3


 


Lymphocytes # (Manual)  0.8 L    (1.2-5.4)  K/mm3


 


Monocytes # (Manual)  1.2 H    (0.0-0.8)  K/mm3


 


Sodium   135 L   (137-145)  mmol/L


 


Potassium   3.3 L   (3.6-5.0)  mmol/L


 


Chloride   95.8 L   ()  mmol/L


 


BUN   19 H   (7-17)  mg/dL


 


Glucose   322 H   ()  mg/dL


 


POC Glucose     ()  


 


Hemoglobin A1c    8.9 H  (4-6)  %














  06/26/20 06/26/20 Range/Units





  07:42 10:09 


 


WBC    (4.5-11.0)  K/mm3


 


Hgb    (10.1-14.3)  gm/dl


 


Hct    (30.3-42.9)  %


 


MCV    (79-97)  fl


 


MCH    (28-32)  pg


 


RDW    (13.2-15.2)  %


 


Lymphocytes % (Manual)    (13.4-35.0)  %


 


Seg Neutrophils # Man    (1.8-7.7)  K/mm3


 


Lymphocytes # (Manual)    (1.2-5.4)  K/mm3


 


Monocytes # (Manual)    (0.0-0.8)  K/mm3


 


Sodium    (137-145)  mmol/L


 


Potassium    (3.6-5.0)  mmol/L


 


Chloride    ()  mmol/L


 


BUN    (7-17)  mg/dL


 


Glucose    ()  mg/dL


 


POC Glucose  307 H  288 H  ()  


 


Hemoglobin A1c    (4-6)  %








                                 Diabetes panel











  06/26/20 06/26/20 Range/Units





  00:21 00:21 


 


Sodium  135 L   (137-145)  mmol/L


 


Potassium  3.3 L   (3.6-5.0)  mmol/L


 


Chloride  95.8 L   ()  mmol/L


 


Carbon Dioxide  23   (22-30)  mmol/L


 


BUN  19 H   (7-17)  mg/dL


 


Creatinine  1.2   (0.7-1.2)  mg/dL


 


Glucose  322 H   ()  mg/dL


 


Hemoglobin A1c   8.9 H  (4-6)  %


 


Calcium  8.8   (8.4-10.2)  mg/dL








                                  Calcium panel











  06/26/20 Range/Units





  00:21 


 


Calcium  8.8  (8.4-10.2)  mg/dL








                                 Pituitary panel











  06/26/20 Range/Units





  00:21 


 


Sodium  135 L  (137-145)  mmol/L


 


Potassium  3.3 L  (3.6-5.0)  mmol/L


 


Chloride  95.8 L  ()  mmol/L


 


Carbon Dioxide  23  (22-30)  mmol/L


 


BUN  19 H  (7-17)  mg/dL


 


Creatinine  1.2  (0.7-1.2)  mg/dL


 


Glucose  322 H  ()  mg/dL


 


Calcium  8.8  (8.4-10.2)  mg/dL








                                  Adrenal panel











  06/26/20 Range/Units





  00:21 


 


Sodium  135 L  (137-145)  mmol/L


 


Potassium  3.3 L  (3.6-5.0)  mmol/L


 


Chloride  95.8 L  ()  mmol/L


 


Carbon Dioxide  23  (22-30)  mmol/L


 


BUN  19 H  (7-17)  mg/dL


 


Creatinine  1.2  (0.7-1.2)  mg/dL


 


Glucose  322 H  ()  mg/dL


 


Calcium  8.8  (8.4-10.2)  mg/dL














- Imaging


CT scan - pelvis: report reviewed, image reviewed





Assessment and Plan





40-year-old female with 





1. necrotizing fasciitis of the buttock


2. sepsis 2/2 #1


3. diabetes





Plan:


1. NPO


2. IVF


3. IV abx


4. ID consult


5. prn pain control


6. wound care consult for post op management


7. DVT ppx


8. strict glucose control, obtain hemoglobin A1c


9.  Recommend emergent incision and drainage, debridement of necrotizing 

fasciitis of the buttock.  I discussed the findings of the CT scan and the 

locations of necrotizing fasciitis to the patient.  I explained all risks, 

benefits, alternatives to surgical management.  Consent obtained for incision 

and drainage of abscess, debridement of necrotizing fasciitis of the 

buttock/perineum.  We will proceed to the OR this a.m.


10.  We will transfer to the [facility] after surgery 





Explained to the patient that she may need to be taken back to the operating richard

m multiple times in order to facilitate dressing changes and further 

debridement.  The patient understands.  She states that she has no family or 

friends in the area and has pets at home that she is concerned about.  She 

states that she cannot stay in the hospital for several days.  I offered her the

assistance of case management.  Will order case management consult.





Thank you, please call with questions or concerns.





Evaluation and treatment of this patient was during the time of the national and

state emergency arising from COVID19 coronavirus pandemic. Treatment and 

procedures performed meet the current and available best practice and guidelines

for patient during the COVID pandemic.

## 2020-06-26 NOTE — POST OPERATIVE NOTE
Pre-op diagnosis: necrotizing fasciitis buttocks


Post-op diagnosis: same


Findings: 





Extensive necrotizing fasciitis of right buttock and portion of left buttock. 

Undermining circumfrentially on right. Wounds 2-3 cm from anus.





Left side: 4cm x 2.5cm x 2cm


R side: 13cm x 7cm x 5cm 





Procedure: 





Excisional debridement of necrotizing fasciitis bilateral buttocks


Anesthesia: GETA, local


Surgeon: ANN VALE


Estimated blood loss: 50-100ml


Pathology: list (wound cultures)


Specimen disposition: to lab


Condition: stable


Disposition: PACU

## 2020-06-26 NOTE — HISTORY AND PHYSICAL REPORT
History of Present Illness


Date of examination: 06/26/20


Date of admission: 


6/26/20





Chief complaint: 





"boil that outgrew it self"


History of present illness: 


 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 

right leg limp and numbness of right hand, and TIA presents to [facility] for 

evaluation and treatment of a "boil on my butt" with swelling, redness and pain.

She states there was a small amount of drainage earlier today. She endorses a f

ever a couple days ago and nausea and vomiting since Tuesday, 6/23.  She states 

that the "boil" was noticed on Monday, 6/22 and it started hurting and spread to

both buttocks. She also states she noticed mold in her bathtub on on 6/24 and 

she soaked in her tub on 6/21.  At the time of my exam she states she has a 

headache. She denies chest pain, chills, shortness of breath, weight loss, 

cough. She denies any recent sick contacts, COVID exposure, or recent travel. ED

workup revealed leukocytosis (WBC 38.5), lactic acidosis (LA 1.7), hypokalemia 

(K 3.3), hyperglycemia (glucose 322) and a CT pelvis with contrast revealed 

bilateral perineal abscesses.








  





Past History


Past Medical History: diabetes, hypertension, stroke, other (TIA, Asthma)


Past Surgical History: No surgical history


Social history: single, Lives alone, smoking (1 pack per 2-3  days, daily 

marijuana use), other (social ETOH use)


Family history: hypertension, other (COPD, CHF, asthma)





Medications and Allergies


                                    Allergies











Allergy/AdvReac Type Severity Reaction Status Date / Time


 


aspirin Allergy Unknown Unknown Verified 11/21/17 11:41











                                Home Medications











 Medication  Instructions  Recorded  Confirmed  Last Taken  Type


 


RX: AtorvaSTATin [Lipitor] 40 mg PO QHS #30 tablet 01/10/18  Unknown Rx


 


RX: Clopidogrel [Plavix] 75 mg PO QDAY #30 tablet 01/10/18  Unknown Rx


 


RX: Metformin HCl [metFORMIN] 1,000 mg PO BID #60 tablet 01/10/18 01/10/18 

Unknown Rx


 


RX: amLODIPine 10 mg PO DAILY #30 tab 01/10/18  Unknown Rx


 


RX: glipiZIDE [glipiZIDE ER] 5 mg PO DAILY 60 Days #30 tab.er.24 01/10/18  

Unknown Rx














Review of Systems


All systems: negative


Constitutional: fever, poor appetite, no weight loss, no weight gain, no chills,

no sweats, no fatigue


Ears, nose, mouth and throat: no ear pain, no ear discharge, no nasal 

congestion, no nasal discharge


Breasts: normal


Cardiovascular: high blood pressure, no chest pain, no palpitations, no edema, 

no shortness of breath


Respiratory: no cough, no shortness of breath


Gastrointestinal: nausea, vomiting, no abdominal pain


Genitourinary Female: no pelvic pain, no vaginal discharge


Menstruation: other ("I may have started my period")


Rectal: no pain, no bleeding


Musculoskeletal: leg numbness/tingling (Right leg limp residual from CVA), no 

neck stiffness, no neck pain


Integumentary: redness, wounds (buttock wound with pain, redness, slight 

drainge, and "need to be drained")


Neurological: weakness, tingling (right UE and LE)


Psychiatric: no anxiety, no disorientation


Endocrine: no cold intolerance, no excessive thirst


Hematologic/Lymphatic: no easy bruising, no easy bleeding


Allergic/Immunologic: no allergic rhinitis, no wheezing, no persistent 

infections





Exam





- Constitutional


Vitals: 


                                        











Temp Pulse Resp BP Pulse Ox


 


 98.1 F   114 H  18   113/56   96 


 


 06/25/20 23:41  06/25/20 23:41  06/25/20 23:41  06/25/20 23:41  06/25/20 23:41











General appearance: Present: no acute distress, well-nourished





- EENT


Eyes: Present: PERRL, EOM intact


ENT: hearing intact





- Neck


Neck: Present: supple, normal ROM





- Respiratory


Respiratory effort: normal


Respiratory: bilateral: CTA





- Cardiovascular


Rhythm: regular


Heart Sounds: Present: S1 & S2.  Absent: systolic murmur, diastolic murmur





- Extremities


Extremities: no ischemia, pulses intact, pulses symmetrical, No edema, normal 

color


Peripheral Pulses: within normal limits





- Abdominal


General gastrointestinal: Present: soft, non-tender, non-distended, normal bowel

sounds


Female genitourinary: Present: normal





- Integumentary


Integumentary: Present: erythema (Redness to bilateral butock which is greater 

on the right, warm to touch and hard on palpation but not tender according to 

the patient "after my pain medication" )





- Musculoskeletal


Musculoskeletal: strength equal bilaterally





- Psychiatric


Psychiatric: appropriate mood/affect, intact judgment & insight





- Neurologic


Neurologic: CNII-XII intact, focal deficits, moves all extremities





- Allied Health


Allied health notes reviewed: nursing





HEART Score





- HEART Score


History: Slightly suspicious


EKG: Normal


Age: < 45


Risk factors: 1-2 risk factors





Results





- Labs


CBC & Chem 7: 


                                 06/26/20 00:21





                                 06/26/20 00:21


Labs: 


                             Laboratory Last Values











WBC  38.5 K/mm3 (4.5-11.0)  H  06/26/20  00:21    


 


RBC  3.82 M/mm3 (3.65-5.03)   06/26/20  00:21    


 


Hgb  8.7 gm/dl (10.1-14.3)  L  06/26/20  00:21    


 


Hct  27.1 % (30.3-42.9)  L  06/26/20  00:21    


 


MCV  71 fl (79-97)  L  06/26/20  00:21    


 


MCH  23 pg (28-32)  L  06/26/20  00:21    


 


MCHC  32 % (30-34)   06/26/20  00:21    


 


RDW  18.3 % (13.2-15.2)  H  06/26/20  00:21    


 


Plt Count  169 K/mm3 (140-440)   06/26/20  00:21    


 


Seg Neutrophils %  Np   06/26/20  00:21    


 


Sodium  135 mmol/L (137-145)  L  06/26/20  00:21    


 


Potassium  3.3 mmol/L (3.6-5.0)  L  06/26/20  00:21    


 


Chloride  95.8 mmol/L ()  L  06/26/20  00:21    


 


Carbon Dioxide  23 mmol/L (22-30)   06/26/20  00:21    


 


Anion Gap  20 mmol/L  06/26/20  00:21    


 


BUN  19 mg/dL (7-17)  H  06/26/20  00:21    


 


Creatinine  1.2 mg/dL (0.7-1.2)   06/26/20  00:21    


 


Estimated GFR  > 60 ml/min  06/26/20  00:21    


 


BUN/Creatinine Ratio  16 %  06/26/20  00:21    


 


Glucose  322 mg/dL ()  H  06/26/20  00:21    


 


Lactic Acid  1.70 mmol/L (0.7-2.0)   06/26/20  01:46    


 


Calcium  8.8 mg/dL (8.4-10.2)   06/26/20  00:21    


 


HCG, Qual  Negative  (Negative)   06/26/20  00:21    














- Imaging and Cardiology


CT scan - pelvis: report reviewed





- Diagnostic Impressions


Diagnostic Impressions: 


1. Pelvis with contrast revealed bilateral perineal abscesses.


2. Multiple right-sided pelvic or abdominal masses possibly uterine fibroids but

more superior mass appears separate from the uterus and could represent 

inflammatory masses or masses of adenopathy


Liz/IV: 


                                        





IV Catheter Type [Left           INT / Saline Lock


Antecubital]                     











Assessment and Plan


VTE prophylaxis?: Mechanical


Plan of care discussed with patient/family: Yes





- Patient Problems


(1) Perianal abscess


Current Visit: Yes   Status: Acute   


Plan to address problem: 


-Place surgical consult to Dr. Calvin for I&D of abscess


-Culture and sensitivity of the drainage


-IV Clindamycin, Flagyl, Zosyn








(2) Leukocytosis


Current Visit: Yes   Status: Acute   


Qualifiers: 


   Leukocytosis type: bandemia   Qualified Code(s): D72.825 - Bandemia   


Plan to address problem: 


- I&D of abscess


-Abx therapy of Clindamycin, Flagyl, and Zosyn


-6/26: BC obtained








(3) Diabetes


Current Visit: No   Status: Chronic   


Plan to address problem: 


- BG ACHS


- Hypoglycemia protocol


- SSI








(4) Hypokalemia


Current Visit: Yes   Status: Acute   


Plan to address problem: 


-K-Dur ordered in ED


-will f/u BMP








(5) Hypertension


Current Visit: No   Status: Chronic   





(6) Lactic acidosis


Current Visit: Yes   Status: Acute   


Plan to address problem: 


-NS bolus of 2500 received in the ED








(7) DVT prophylaxis


Current Visit: Yes   Status: Acute   


Plan to address problem: 


-SCDs while in bed








(8) Full code status


Current Visit: Yes   Status: Acute

## 2020-06-26 NOTE — POST ANESTHESIA EVALUATION
- Post Anesthesia Evaluation


Patient Participated: Yes


Airway Patent: Yes


Stable Respiratory Function: Yes


Nausea/Vomiting: No


Temp > 96.8F: Yes


Pain Manageable: Yes


Adequeate Hydration: Yes


Anesthesia Complications: No


Other Comments: On initial arrival to PACU, patient complained of inability to 

move legs and stated that she felt like "cement was in her body." However, b/l 

LE strength was normal and patient was moving all extremities without 

difficulty. Reassured patient that she was likely exeriencing residual sedative 

effects of GA and that her exam was normal. Symptoms resolved as patient became 

more awake.

## 2020-06-26 NOTE — EMERGENCY DEPARTMENT REPORT
- General


Chief complaint: Skin/Abscess/Foreign Body


Stated complaint: BOIL ON BUTTOCKS


Time Seen by Provider: 20 00:04


Source: patient, EMS


Mode of arrival: Ambulatory


Limitations: No Limitations





- History of Present Illness


Initial comments: 





This is a 40-year-old female nontoxic, well nourished in appearance, no acute 

signs of distress presents to the ED with c/o of swelling, redness and pain to 

bilateral buttock area.  Patient denies any pus or drainage.  Patient denies any

fever, chills, nausea, vomiting, chest pain, shortness of breath, headache or 

stiff neck.  Patient stated allergies to aspirin.  


MD complaint: abscess/boil


-: days(s)


Location: buttocks


Severity: moderate


Severity scale (0 -10): 8


Quality: aching


Consistency: constant


Improves with: none


Worsens with: none


Associated symptoms: denies other symptoms


Treatments Prior to Arrival: none





- Related Data


                                  Previous Rx's











 Medication  Instructions  Recorded  Last Taken  Type


 


AtorvaSTATin [Lipitor] 40 mg PO QHS #30 tablet 01/10/18 Unknown Rx


 


Clopidogrel [Plavix] 75 mg PO QDAY #30 tablet 01/10/18 Unknown Rx


 


Metformin HCl [metFORMIN] 1,000 mg PO BID #60 tablet 01/10/18 Unknown Rx


 


amLODIPine 10 mg PO DAILY #30 tab 01/10/18 Unknown Rx


 


glipiZIDE [glipiZIDE ER] 5 mg PO DAILY 60 Days #30 tab.er.24 01/10/18 Unknown Rx











                                    Allergies











Allergy/AdvReac Type Severity Reaction Status Date / Time


 


aspirin Allergy Unknown Unknown Verified 17 11:41














Abscess Boil HPI





- HPI


Chief Complaint: Skin/Abscess/Foreign Body


Stated Complaint: BOIL ON BUTTOCKS


Time Seen by Provider: 20 00:04


Home Medications: 


                                  Previous Rx's











 Medication  Instructions  Recorded  Last Taken  Type


 


AtorvaSTATin [Lipitor] 40 mg PO QHS #30 tablet 01/10/18 Unknown Rx


 


Clopidogrel [Plavix] 75 mg PO QDAY #30 tablet 01/10/18 Unknown Rx


 


Metformin HCl [metFORMIN] 1,000 mg PO BID #60 tablet 01/10/18 Unknown Rx


 


amLODIPine 10 mg PO DAILY #30 tab 01/10/18 Unknown Rx


 


glipiZIDE [glipiZIDE ER] 5 mg PO DAILY 60 Days #30 tab.er.24 01/10/18 Unknown Rx











Allergies/Adverse Reactions: 


                                    Allergies











Allergy/AdvReac Type Severity Reaction Status Date / Time


 


aspirin Allergy Unknown Unknown Verified 17 11:41














ED Review of Systems


ROS: 


Stated complaint: BOIL ON BUTTOCKS


Other details as noted in HPI





Constitutional: denies: chills, fever


Eyes: denies: eye pain, eye discharge, vision change


ENT: denies: ear pain, throat pain


Respiratory: denies: cough, shortness of breath, wheezing


Cardiovascular: denies: chest pain, palpitations


Endocrine: no symptoms reported


Gastrointestinal: denies: abdominal pain, nausea, diarrhea


Genitourinary: denies: urgency, dysuria, discharge


Musculoskeletal: denies: back pain, joint swelling, arthralgia


Skin: denies: rash, lesions


Neurological: denies: headache, weakness, paresthesias


Psychiatric: denies: anxiety, depression


Hematological/Lymphatic: denies: easy bleeding, easy bruising





ED Past Medical Hx





- Past Medical History


Previous Medical History?: Yes


Hx Hypertension: Yes


Hx Diabetes: Yes


Hx Asthma: Yes





- Surgical History


Past Surgical History?: No





- Social History


Smoking Status: Current Every Day Smoker


Substance Use Type: Marijuana





- Medications


Home Medications: 


                                Home Medications











 Medication  Instructions  Recorded  Confirmed  Last Taken  Type


 


AtorvaSTATin [Lipitor] 40 mg PO QHS #30 tablet 01/10/18  Unknown Rx


 


Clopidogrel [Plavix] 75 mg PO QDAY #30 tablet 01/10/18  Unknown Rx


 


Metformin HCl [metFORMIN] 1,000 mg PO BID #60 tablet 01/10/18 01/10/18 Unknown 

Rx


 


amLODIPine 10 mg PO DAILY #30 tab 01/10/18  Unknown Rx


 


glipiZIDE [glipiZIDE ER] 5 mg PO DAILY 60 Days #30 tab.er.24 01/10/18  Unknown 

Rx














ED Physical Exam





- General


Limitations: No Limitations


General appearance: alert, in no apparent distress





- Head


Head exam: Present: atraumatic, normocephalic





- Neck


Neck exam: Present: normal inspection, full ROM.  Absent: tenderness, 

meningismus, lymphadenopathy





- Extremities Exam


Extremities exam: Present: normal inspection, full ROM





- Back Exam


Back exam: Present: normal inspection, full ROM.  Absent: tenderness, CVA t

enderness (R), CVA tenderness (L), muscle spasm, paraspinal tenderness, 

vertebral tenderness, rash noted





- Neurological Exam


Neurological exam: Present: alert, oriented X3, normal gait





- Psychiatric


Psychiatric exam: Present: normal affect, normal mood





- Skin


Skin exam: Present: warm, dry, intact, normal color.  Absent: rash





- Other


Other exam information: 





bilateral redness and pain.  Right sided buttock swelling.





ED Course


                                   Vital Signs











  20





  23:41


 


Temperature 98.1 F


 


Pulse Rate 114 H


 


Respiratory 18





Rate 


 


Blood Pressure 113/56


 


O2 Sat by Pulse 96





Oximetry 














- Reevaluation(s)


Reevaluation #1: 





20 00:42


Patient is speaking in full sentences with no signs of distress noted.





- Consultations


Consultation #1: 





20 02:52


Patient has been consulted with DELPHINE Dubois about patient history, physical 

exam, and labs/CT results and agrees for admission.





ED Medical Decision Making





- Lab Data


Result diagrams: 


                                 20 00:21





                                 20 00:21








                                   Lab Results











  20 Range/Units





  00:21 00:21 00:21 


 


WBC  38.5 H    (4.5-11.0)  K/mm3


 


RBC  3.82    (3.65-5.03)  M/mm3


 


Hgb  8.7 L    (10.1-14.3)  gm/dl


 


Hct  27.1 L    (30.3-42.9)  %


 


MCV  71 L    (79-97)  fl


 


MCH  23 L    (28-32)  pg


 


MCHC  32    (30-34)  %


 


RDW  18.3 H    (13.2-15.2)  %


 


Plt Count  169    (140-440)  K/mm3


 


Seg Neutrophils %  Np    


 


Sodium   135 L   (137-145)  mmol/L


 


Potassium   3.3 L   (3.6-5.0)  mmol/L


 


Chloride   95.8 L   ()  mmol/L


 


Carbon Dioxide   23   (22-30)  mmol/L


 


Anion Gap   20   mmol/L


 


BUN   19 H   (7-17)  mg/dL


 


Creatinine   1.2   (0.7-1.2)  mg/dL


 


Estimated GFR   > 60   ml/min


 


BUN/Creatinine Ratio   16   %


 


Glucose   322 H   ()  mg/dL


 


Lactic Acid     (0.7-2.0)  mmol/L


 


Calcium   8.8   (8.4-10.2)  mg/dL


 


HCG, Qual    Negative  (Negative)  














  20 Range/Units





  01:46 


 


WBC   (4.5-11.0)  K/mm3


 


RBC   (3.65-5.03)  M/mm3


 


Hgb   (10.1-14.3)  gm/dl


 


Hct   (30.3-42.9)  %


 


MCV   (79-97)  fl


 


MCH   (28-32)  pg


 


MCHC   (30-34)  %


 


RDW   (13.2-15.2)  %


 


Plt Count   (140-440)  K/mm3


 


Seg Neutrophils %   


 


Sodium   (137-145)  mmol/L


 


Potassium   (3.6-5.0)  mmol/L


 


Chloride   ()  mmol/L


 


Carbon Dioxide   (22-30)  mmol/L


 


Anion Gap   mmol/L


 


BUN   (7-17)  mg/dL


 


Creatinine   (0.7-1.2)  mg/dL


 


Estimated GFR   ml/min


 


BUN/Creatinine Ratio   %


 


Glucose   ()  mg/dL


 


Lactic Acid  1.70  (0.7-2.0)  mmol/L


 


Calcium   (8.4-10.2)  mg/dL


 


HCG, Qual   (Negative)  














- Radiology Data











Referring Physician: LUI MAYS


 


Patient Name: ROHIT PERKINS


 


Patient ID: [id number]


 


YOB: 1980


 


Sex: Female


 


Accession: [id number]


 


Report Date: 2020


 


Report Status: Finalized








[place] [address] Cat

Scan Report Signed Patient: ROHIT ESQUIVEL MR#: [id number] : 

1980 Acct:[id number] Age/Sex: 40 / F ADM Date: 20 Loc: ED Attdelphine almeida Dr: Ordering Physician: LUI MAYS NP Date of Service: 20 

Procedure(s): CT pelvis w con Accession Number(s): [id number] cc: LUI MAYS NP

CT pelvis w con INDICATION: Pt complains of perianal pain and swelling, abscess.

TECHNIQUE: All CT scans at this location are performed using CT dose reduction 

for ALARA by means of automated exposure control. COMPARISON: None available. 

FINDINGS: There is considerable abnormal fluid and gas in the soft tissues of 

the perineum bilaterally, worse on the right, with gas extending anteriorly and 

superiorly into the subcutaneous tissues of the right lower pelvis. Appearance 

is very worrisome for bilateral perineal abscess. There are several abnormal, 

complex but mostly solid mass is in the retroperitoneum and pelvis. Some could 

represent uterine fibroids, but the more superior mass is appear completely 

separate from the uterus and may represent large masses adenopathy. There is 

also what is thought to be an abnormal fluid collection in the right lower 

pelvis. No skeletal lesions. IMPRESSION: 1. Bilateral perineal abscesses 2. 

Multiple right-sided pelvic or abdominal masses. Some could be uterine fibroids,

but the more superior mass appears separate from the uterus and could represent 

inflammatory masses or masses of adenopathy. Signer Name: Isidoro Valle MD 

Signed: 2020 2:40 AM Workstation Name: [id number] Transcribed By: TM 

Dictated By: Isidoro Valle MD Electronically Authenticated By: Isidoro Valle MD Signed Date/Time: 20 DD/DT: 20 TD/TT: 





- Medical Decision Making





40-year-old female that presents with leukocytosis and perianal abscess.  

Patient is stable and was examined by me.  Patient admitted with DR. Lopez.  At

time of admission, the patient does not seem toxic or ill in appearance.  No 

acute signs of distress noted.  Patient agrees to admission treatment plan of 

care.  No further questions noted by the patient.


Critical care attestation.: 


If time is entered above; I have spent that time in minutes in the direct care 

of this critically ill patient, excluding procedure time.








ED Disposition


Clinical Impression: 


 Perianal abscess





Leukocytosis


Qualifiers:


 Leukocytosis type: unspecified Qualified Code(s): D72.829 - Elevated white 

blood cell count, unspecified





Disposition: -09 OP ADMIT IP TO THIS HOSP


Is pt being admited?: Yes


Condition: Stable

## 2020-06-26 NOTE — PROGRESS NOTE
Assessment and Plan


Assessment and plan: 





Perirectal abscess.  Surgery consulted.  Follow-up culture and sensitivity.  IV 

antibiotics.  ID consultation.





Sepsis.  Present on admission.  Etiology secondary to above.  Patient meets 

criteria given the fever, leukocytosis and diagnosis of abscess.





Leukocytosis.  Etiology secondary to above.  Continue to follow CBC.





Diabetes mellitus type 2.  Continue Accu-Cheks and sliding scale insulin.  Tight

glycemic control to promote wound healing.





Hypertension.  Resume antihypertensive medications.





Hypokalemia.  Replete potassium.





History


Interval history: 





No new issues overnight.





Hospitalist Physical





- Constitutional


Vitals: 


                                        











Temp Pulse Resp BP Pulse Ox


 


 101.1 F H  99 H  16   119/51   99 


 


 06/26/20 11:00  06/26/20 11:00  06/26/20 11:00  06/26/20 11:00  06/26/20 11:00











General appearance: Present: no acute distress, well-nourished





- EENT


Eyes: Present: PERRL, EOM intact


ENT: hearing intact, clear oral mucosa, dentition normal





- Neck


Neck: Present: supple, normal ROM





- Respiratory


Respiratory effort: normal


Respiratory: bilateral: CTA





- Cardiovascular


Rhythm: regular


Heart Sounds: Present: S1 & S2.  Absent: gallop, rub





- Extremities


Extremities: no ischemia, No edema, Full ROM





- Abdominal


General gastrointestinal: soft, non-tender, non-distended, normal bowel sounds





- Integumentary


Integumentary: Present: clear, warm, dry





- Neurologic


Neurologic: CNII-XII intact, moves all extremities





HEART Score





- HEART Score


EKG: Normal


Age: < 45


Risk factors: 1-2 risk factors





Results





- Labs


CBC & Chem 7: 


                                 06/26/20 00:21





                                 06/26/20 00:21


Labs: 


                             Laboratory Last Values











WBC  38.5 K/mm3 (4.5-11.0)  H  06/26/20  00:21    


 


RBC  3.82 M/mm3 (3.65-5.03)   06/26/20  00:21    


 


Hgb  8.7 gm/dl (10.1-14.3)  L  06/26/20  00:21    


 


Hct  27.1 % (30.3-42.9)  L  06/26/20  00:21    


 


MCV  71 fl (79-97)  L  06/26/20  00:21    


 


MCH  23 pg (28-32)  L  06/26/20  00:21    


 


MCHC  32 % (30-34)   06/26/20  00:21    


 


RDW  18.3 % (13.2-15.2)  H  06/26/20  00:21    


 


Plt Count  169 K/mm3 (140-440)   06/26/20  00:21    


 


Add Manual Diff  Complete   06/26/20  00:21    


 


Total Counted  100   06/26/20  00:21    


 


Seg Neutrophils %  Np   06/26/20  00:21    


 


Seg Neuts % (Manual)  69.0 % (40.0-70.0)   06/26/20  00:21    


 


Band Neutrophils %  26.0 %  06/26/20  00:21    


 


Lymphocytes % (Manual)  2.0 % (13.4-35.0)  L  06/26/20  00:21    


 


Reactive Lymphs % (Man)  0 %  06/26/20  00:21    


 


Monocytes % (Manual)  3.0 % (0.0-7.3)   06/26/20  00:21    


 


Eosinophils % (Manual)  0 % (0.0-4.3)   06/26/20  00:21    


 


Basophils % (Manual)  0 % (0.0-1.8)   06/26/20  00:21    


 


Metamyelocytes %  0 %  06/26/20  00:21    


 


Myelocytes %  0 %  06/26/20  00:21    


 


Promyelocytes %  0 %  06/26/20  00:21    


 


Blast Cells %  0 %  06/26/20  00:21    


 


Nucleated RBC %  Not Reportable   06/26/20  00:21    


 


Seg Neutrophils # Man  26.6 K/mm3 (1.8-7.7)  H  06/26/20  00:21    


 


Band Neutrophils #  10.0 K/mm3  06/26/20  00:21    


 


Lymphocytes # (Manual)  0.8 K/mm3 (1.2-5.4)  L  06/26/20  00:21    


 


Abs React Lymphs (Man)  0.0 K/mm3  06/26/20  00:21    


 


Monocytes # (Manual)  1.2 K/mm3 (0.0-0.8)  H  06/26/20  00:21    


 


Eosinophils # (Manual)  0.0 K/mm3 (0.0-0.4)   06/26/20  00:21    


 


Basophils # (Manual)  0.0 K/mm3 (0.0-0.1)   06/26/20  00:21    


 


Metamyelocytes #  0.0 K/mm3  06/26/20  00:21    


 


Myelocytes #  0.0 K/mm3  06/26/20  00:21    


 


Promyelocytes #  0.0 K/mm3  06/26/20  00:21    


 


Blast Cells #  0.0 K/mm3  06/26/20  00:21    


 


WBC Morphology  Not Reportable   06/26/20  00:21    


 


Hypersegmented Neuts  Not Reportable   06/26/20  00:21    


 


Hyposegmented Neuts  Not Reportable   06/26/20  00:21    


 


Hypogranular Neuts  Not Reportable   06/26/20  00:21    


 


Smudge Cells  Not Reportable   06/26/20  00:21    


 


Toxic Granulation  Not Reportable   06/26/20  00:21    


 


Toxic Vacuolation  Not Reportable   06/26/20  00:21    


 


Dohle Bodies  Not Reportable   06/26/20  00:21    


 


Pelger-Huet Anomaly  Not Reportable   06/26/20  00:21    


 


Jeanie Rods  Not Reportable   06/26/20  00:21    


 


Platelet Estimate  Cons   06/26/20  00:21    


 


Clumped Platelets  Not Reportable   06/26/20  00:21    


 


Plt Clumps, EDTA  Not Reportable   06/26/20  00:21    


 


Large Platelets  Few   06/26/20  00:21    


 


Giant Platelets  Not Reportable   06/26/20  00:21    


 


Platelet Satelliting  Not Reportable   06/26/20  00:21    


 


Plt Morphology Comment  Not Reportable   06/26/20  00:21    


 


RBC Morphology  Not Reportable   06/26/20  00:21    


 


Dimorphic RBCs  Not Reportable   06/26/20  00:21    


 


Polychromasia  Not Reportable   06/26/20  00:21    


 


Hypochromasia  1+   06/26/20  00:21    


 


Poikilocytosis  Not Reportable   06/26/20  00:21    


 


Anisocytosis  Not Reportable   06/26/20  00:21    


 


Microcytosis  Not Reportable   06/26/20  00:21    


 


Macrocytosis  Not Reportable   06/26/20  00:21    


 


Spherocytes  Not Reportable   06/26/20  00:21    


 


Pappenheimer Bodies  Not Reportable   06/26/20  00:21    


 


Sickle Cells  Not Reportable   06/26/20  00:21    


 


Target Cells  Not Reportable   06/26/20  00:21    


 


Tear Drop Cells  Not Reportable   06/26/20  00:21    


 


Ovalocytes  Rare   06/26/20  00:21    


 


Helmet Cells  Not Reportable   06/26/20  00:21    


 


Judge-Vaughn Bodies  Not Reportable   06/26/20  00:21    


 


Cabot Rings  Not Reportable   06/26/20  00:21    


 


Marlow Cells  Few   06/26/20  00:21    


 


Bite Cells  Not Reportable   06/26/20  00:21    


 


Crenated Cell  Not Reportable   06/26/20  00:21    


 


Elliptocytes  Rare   06/26/20  00:21    


 


Acanthocytes (Spur)  Not Reportable   06/26/20  00:21    


 


Rouleaux  Not Reportable   06/26/20  00:21    


 


Hemoglobin C Crystals  Not Reportable   06/26/20  00:21    


 


Schistocytes  Not Reportable   06/26/20  00:21    


 


Malaria parasites  Not Reportable   06/26/20  00:21    


 


Ayad Bodies  Not Reportable   06/26/20  00:21    


 


Hem Pathologist Commnt  No   06/26/20  00:21    


 


Sodium  135 mmol/L (137-145)  L  06/26/20  00:21    


 


Potassium  3.3 mmol/L (3.6-5.0)  L  06/26/20  00:21    


 


Chloride  95.8 mmol/L ()  L  06/26/20  00:21    


 


Carbon Dioxide  23 mmol/L (22-30)   06/26/20  00:21    


 


Anion Gap  20 mmol/L  06/26/20  00:21    


 


BUN  19 mg/dL (7-17)  H  06/26/20  00:21    


 


Creatinine  1.2 mg/dL (0.7-1.2)   06/26/20  00:21    


 


Estimated GFR  > 60 ml/min  06/26/20  00:21    


 


BUN/Creatinine Ratio  16 %  06/26/20  00:21    


 


Glucose  322 mg/dL ()  H  06/26/20  00:21    


 


POC Glucose  210  ()  H  06/26/20  11:17    


 


Hemoglobin A1c  8.9 % (4-6)  H  06/26/20  00:21    


 


Lactic Acid  1.70 mmol/L (0.7-2.0)   06/26/20  01:46    


 


Calcium  8.8 mg/dL (8.4-10.2)   06/26/20  00:21    


 


HCG, Qual  Negative  (Negative)   06/26/20  00:21    











Microbiology: 


Microbiology





06/26/20 02:33   Peripheral/Venous   Blood Culture - Preliminary


                            Culture in Progress


06/26/20 01:46   Peripheral/Venous   Blood Culture - Preliminary


                            Culture in Progress








Liz/IV: 


                                        





Voiding Method                   Toilet


IV Catheter Type [Left           INT / Saline Lock


Antecubital]                     











Active Medications





- Current Medications


Current Medications: 














Generic Name Dose Route Start Last Admin





  Trade Name Freq  PRN Reason Stop Dose Admin


 


Acetaminophen  650 mg  06/26/20 03:58 





  Tylenol  PO  





  Q4H PRN  





  Pain MILD(1-3)/Fever >100.5/HA  


 


Dextrose  0 ml  06/26/20 03:58 





  D50w (25gm) Syringe  IV  





  Q30MIN PRN  





  Hypoglycemia  





  Protocol  


 


Hydromorphone HCl  0.5 mg  06/26/20 10:46 





  Dilaudid  IV  





  Q10MIN PRN  





  Pain , Severe (7-10)  


 


Sodium Chloride  1,000 mls @ 125 mls/hr  06/26/20 04:00  06/26/20 06:02





  Nacl 0.9% 1000 Ml  IV   125 mls/hr





  AS DIRECT JACKIE   Administration


 


Piperacillin Sod/Tazobactam Sod  4.5 gm in 100 mls @ 200 mls/hr  06/26/20 06:00 

06/26/20 07:21





  Zosyn/Ns 4.5gm/100ml  IV   Infused





  Q8HR JACKIE   Infusion





  Protocol  


 


Metronidazole  500 mg in 100 mls @ 100 mls/hr  06/26/20 06:00  06/26/20 07:21





  Flagyl 500 Mg/100 Ml  IV   Infused





  Q8HR JACKIE   Infusion





  Protocol  


 


Sodium Chloride  1,000 mls @ 42 mls/hr  06/26/20 11:30  06/26/20 11:05





  Nacl 0.9% 1000 Ml  IV   42 mls/hr





  AS DIRECT JACKIE   Administration


 


Insulin Human Lispro  0 unit  06/26/20 07:30  06/26/20 08:54





  Humalog  SUB-Q   6 unit





  ACHS JACKIE   Administration





  Protocol  


 


Morphine Sulfate  2 mg  06/26/20 03:58  06/26/20 05:50





  Morphine  IV   2 mg





  Q4H PRN   Administration





  Pain, Moderate (4-6)  


 


Ondansetron HCl  4 mg  06/26/20 03:58 





  Zofran  IV  





  Q8H PRN  





  Nausea And Vomiting  


 


Ondansetron HCl  4 mg  06/26/20 10:46 





  Zofran  IV  06/26/20 23:59 





  ONCE PRN  





  Nausea And Vomiting  


 


Sodium Chloride  10 ml  06/26/20 10:00 





  Sodium Chloride Flush Syringe 10 Ml  IV  





  BID JACKIE  


 


Sodium Chloride  10 ml  06/26/20 03:58 





  Sodium Chloride Flush Syringe 10 Ml  IV  





  PRN PRN  





  LINE FLUSH

## 2020-06-26 NOTE — CAT SCAN REPORT
CT pelvis w con



INDICATION:

Pt complains of perianal pain and swelling,  abscess.



TECHNIQUE:

All CT scans at this location are performed using CT dose reduction for ALARA by means of automated e
xposure control. 



COMPARISON:

None available.



FINDINGS:

There is considerable abnormal fluid and gas in the soft tissues of the perineum bilaterally, worse o
n the right, with gas extending anteriorly and superiorly into the subcutaneous tissues of the right 
lower pelvis. Appearance is very worrisome for bilateral perineal abscess.



There are several abnormal, complex but mostly solid mass is in the retroperitoneum and pelvis. Some 
could represent uterine fibroids, but the more superior mass is appear completely separate from the u
terus and may represent large masses adenopathy. There is also what is thought to be an abnormal flui
d collection in the right lower pelvis.



No skeletal lesions.



IMPRESSION:

1. Bilateral perineal abscesses

2. Multiple right-sided pelvic or abdominal masses. Some could be uterine fibroids, but the more supe
rior mass appears separate from the uterus and could represent inflammatory masses or masses of adeno
jerica. 



Signer Name: Isidoro Valle MD 

Signed: 6/26/2020 2:40 AM

Workstation Name: [id number]

## 2020-06-26 NOTE — CONSULTATION
History of Present Illness





- Reason for Consult


Consult date: 06/26/20


nec fascitis


Requesting physician: ANN VALE





- History of Present Illness


40 years old female with history of asthma, diabetes type 2, left CVA x2, 

admitted on 6/25/2020 due to severe right buttocks pain.  She initially felt a 

boil on 6/22/2020. She squeezed with her fingers initially. Boil became bigger 

and spread to both buttocks.  She also noticed some drainage.  She reports some 

nausea, vomiting and subjective fever.  On arrival, her temperature was 98.1, 

101.1, , RR 18, O2 sat 96, /56.  Initial WBC 38.5.  Hemoglobin 8.7. 

Platelets 169.  Creatinine 1.2.  A1c 8.9.  Glucose 332.  Blood culture 6/26/2020

no growth today.  CT pelvis shows bilateral perineal abscesses with multiple 

righ sided pelvic masses.  Patient was taken to the operating room on 6/26/2020,

underwent right buttocks and portion of left buttocks large debridement due to 

necrotizing fasciitis.


 


Review of Systems: positive in bold print 


General:  fever, chills,  malaise


Cutaneous: right buttocks boil, edema


Head:  headaches or injury


Eyes:  changes in vision, eye pain, double vision


Ears: ear pain, ear discharge, ringing or hearing loss 


Nose: nose bleeding, stuffiness


Mouth & throat: bleeding gums,  horseness, no dental problems, or swollen glands


Neck: no pain, node enlargement/lumps, tyroid enlargement or tenderness


Respiratory:  SOB, cough, CARRILLO, wheezing, sputum, hemoptysis, pleuritic chest 

pain


Cardiovascular:  chest pain, leg edema, cyanosis, CARRILLO, orthopnea


Musculoskeletal:  edema, deformities, pain


Gastrointestinal:  nausea,  vomiting, hematemesis, diarrhea, constipation, 

melena, bright red blood in stools, fecal incontinence, jaundice


Genitourinary/Reproductive: frequent urination, dysuria, hematuria, incontinence


Neurogical:  seizures, headaches,  weakness,  paresthesias,  loss of speech or 

vision;  memory loss, vertigo,   tremors,  numbness


Psychiatric: stable mood;  excessive anxiety, sadness or moodiness


 








 








Past History


Past Medical History: diabetes, hypertension, stroke, other (TIA, Asthma)


Past Surgical History: No surgical history


Social history: single, Lives alone, smoking (1 pack per 2-3  days, daily 

marijuana use), other (social ETOH use)


Family history: hypertension, other (COPD, CHF, asthma)





Medications and Allergies


                                    Allergies











Allergy/AdvReac Type Severity Reaction Status Date / Time


 


aspirin Allergy Unknown Unknown Verified 11/21/17 11:41











                                Home Medications











 Medication  Instructions  Recorded  Confirmed  Last Taken  Type


 


AtorvaSTATin [Lipitor] 40 mg PO QHS #30 tablet 01/10/18  Unknown Rx


 


Clopidogrel [Plavix] 75 mg PO QDAY #30 tablet 01/10/18  Unknown Rx


 


Metformin HCl [metFORMIN] 1,000 mg PO BID #60 tablet 01/10/18 01/10/18 Unknown 

Rx


 


amLODIPine 10 mg PO DAILY #30 tab 01/10/18  Unknown Rx


 


glipiZIDE [glipiZIDE ER] 5 mg PO DAILY 60 Days #30 tab.er.24 01/10/18  Unknown 

Rx











Active Meds: 


Active Medications





Acetaminophen (Tylenol)  650 mg PO Q4H PRN


   PRN Reason: Pain MILD(1-3)/Fever >100.5/HA


   Last Admin: 06/26/20 14:32 Dose:  650 mg


   Documented by: 


Dextrose (D50w (25gm) Syringe)  0 ml IV Q30MIN PRN; Protocol


   PRN Reason: Hypoglycemia


Hydromorphone HCl (Dilaudid)  0.5 mg IV Q10MIN PRN


   PRN Reason: Pain , Severe (7-10)


Hydromorphone HCl (Dilaudid)  1 mg IV Q3H PRN


   PRN Reason: Pain , Severe (7-10)


Sodium Chloride (Nacl 0.9% 1000 Ml)  1,000 mls @ 125 mls/hr IV AS DIRECT JACKIE


   Last Admin: 06/26/20 14:40 Dose:  125 mls/hr


   Documented by: 


Piperacillin Sod/Tazobactam Sod (Zosyn/Ns 4.5gm/100ml)  4.5 gm in 100 mls @ 200 

mls/hr IV Q8HR JACKIE; Protocol


   Last Admin: 06/26/20 16:30 Dose:  200 mls/hr


   Documented by: 


Metronidazole (Flagyl 500 Mg/100 Ml)  500 mg in 100 mls @ 100 mls/hr IV Q8HR 

JACKIE; Protocol


   Last Admin: 06/26/20 14:34 Dose:  100 mls/hr


   Documented by: 


Sodium Chloride (Nacl 0.9% 1000 Ml)  1,000 mls @ 42 mls/hr IV AS DIRECT [facility]


   Last Admin: 06/26/20 11:05 Dose:  42 mls/hr


   Documented by: 


Insulin Human Lispro (Humalog)  0 unit SUB-Q ACHS [facility]; Protocol


   Last Admin: 06/26/20 08:54 Dose:  6 unit


   Documented by: 


Morphine Sulfate (Morphine)  2 mg IV Q4H PRN


   PRN Reason: Pain, Moderate (4-6)


   Last Admin: 06/26/20 05:50 Dose:  2 mg


   Documented by: 


Ondansetron HCl (Zofran)  4 mg IV Q8H PRN


   PRN Reason: Nausea And Vomiting


Ondansetron HCl (Zofran)  4 mg IV ONCE PRN


   PRN Reason: Nausea And Vomiting


   Stop: 06/26/20 23:59


Sodium Chloride (Sodium Chloride Flush Syringe 10 Ml)  10 ml IV BID [facility]


   Last Admin: 06/26/20 14:36 Dose:  10 ml


   Documented by: 


Sodium Chloride (Sodium Chloride Flush Syringe 10 Ml)  10 ml IV PRN PRN


   PRN Reason: LINE FLUSH


Sodium Hypochlorite (Dakin's Half Strength)  1 applic TP Q12H PRN


   PRN Reason: Wound Care











Physical Examination





- Physical Exam


Narrative exam: 











General appearance: Alert in NAD on NC O2


Eyes: anicteric sclerae, moist conjunctivae; no lid-lag; PERRLA 


HENT: Atraumatic; oropharynx clear partiale dentulous


Lungs: CTA, with normal respiratory effort and no intercostal retractions 


CV: RRR no murmur 


Abdomen: Soft, non-tender; no masses or hepatosplenomegaly


Extremities: no edema, no cyanosis


Skin: Sami buttocks surgical dressings


Psych: Appropriate affect, alert and oriented to person, place and time. 


Neuro: alert and oriented x 3. Moving all extermities








 





- Constitutional


Vitals: 


                                   Vital Signs











Temp Pulse Resp BP Pulse Ox


 


 97.8 F   77   17   93/47   98 


 


 06/26/20 12:53  06/26/20 15:00  06/26/20 15:00  06/26/20 15:00  06/26/20 13:08








                           Temperature -Last 24 Hours











Temperature                    97.8 F


 


Temperature                    101.1 F


 


Temperature                    101.1 F


 


Temperature                    99.0 F


 


Temperature                    99.3 F


 


Temperature                    98.6 F


 


Temperature                    98.7 F


 


Temperature                    98.1 F

















Results





- Labs


CBC & Chem 7: 


                                 06/26/20 00:21





                                 06/26/20 00:21


Labs: 


                              Abnormal lab results











  06/26/20 06/26/20 06/26/20 Range/Units





  00:21 00:21 00:21 


 


WBC  38.5 H    (4.5-11.0)  K/mm3


 


Hgb  8.7 L    (10.1-14.3)  gm/dl


 


Hct  27.1 L    (30.3-42.9)  %


 


MCV  71 L    (79-97)  fl


 


MCH  23 L    (28-32)  pg


 


RDW  18.3 H    (13.2-15.2)  %


 


Lymphocytes % (Manual)  2.0 L    (13.4-35.0)  %


 


Seg Neutrophils # Man  26.6 H    (1.8-7.7)  K/mm3


 


Lymphocytes # (Manual)  0.8 L    (1.2-5.4)  K/mm3


 


Monocytes # (Manual)  1.2 H    (0.0-0.8)  K/mm3


 


Sodium   135 L   (137-145)  mmol/L


 


Potassium   3.3 L   (3.6-5.0)  mmol/L


 


Chloride   95.8 L   ()  mmol/L


 


BUN   19 H   (7-17)  mg/dL


 


Glucose   322 H   ()  mg/dL


 


POC Glucose     ()  


 


Hemoglobin A1c    8.9 H  (4-6)  %














  06/26/20 06/26/20 06/26/20 Range/Units





  07:42 10:09 11:17 


 


WBC     (4.5-11.0)  K/mm3


 


Hgb     (10.1-14.3)  gm/dl


 


Hct     (30.3-42.9)  %


 


MCV     (79-97)  fl


 


MCH     (28-32)  pg


 


RDW     (13.2-15.2)  %


 


Lymphocytes % (Manual)     (13.4-35.0)  %


 


Seg Neutrophils # Man     (1.8-7.7)  K/mm3


 


Lymphocytes # (Manual)     (1.2-5.4)  K/mm3


 


Monocytes # (Manual)     (0.0-0.8)  K/mm3


 


Sodium     (137-145)  mmol/L


 


Potassium     (3.6-5.0)  mmol/L


 


Chloride     ()  mmol/L


 


BUN     (7-17)  mg/dL


 


Glucose     ()  mg/dL


 


POC Glucose  307 H  288 H  210 H  ()  


 


Hemoglobin A1c     (4-6)  %














  06/26/20 Range/Units





  13:20 


 


WBC   (4.5-11.0)  K/mm3


 


Hgb   (10.1-14.3)  gm/dl


 


Hct   (30.3-42.9)  %


 


MCV   (79-97)  fl


 


MCH   (28-32)  pg


 


RDW   (13.2-15.2)  %


 


Lymphocytes % (Manual)   (13.4-35.0)  %


 


Seg Neutrophils # Man   (1.8-7.7)  K/mm3


 


Lymphocytes # (Manual)   (1.2-5.4)  K/mm3


 


Monocytes # (Manual)   (0.0-0.8)  K/mm3


 


Sodium   (137-145)  mmol/L


 


Potassium   (3.6-5.0)  mmol/L


 


Chloride   ()  mmol/L


 


BUN   (7-17)  mg/dL


 


Glucose   ()  mg/dL


 


POC Glucose  195 H  ()  


 


Hemoglobin A1c   (4-6)  %














Assessment and Plan








 





Cultures:


Blood culture 6/26/2020 no growth today.





Assessment: 40 years old female with history of asthma, diabetes type 2, left 

CVA x2, admitted on 6/25/2020 due to severe right buttocks pain:





#Severe sepsis: Present on admission with tachycardia, fever, elevated 

leukocytosis and elevated creat; likely due to necrotizing fasciitis of the 

right and left buttocks





#Right and left buttocks necrotizing fasciitis: Unclear etiology, so far blood 

cultures are negative.  Underwent extensive debridement.





#Anemia





#Uncontrolled diabetes:  A1c 8.9.  Glucose 332.





Recommendations:


Start clindamycin 600 g IV every 8 hours


Start vancomycin with PK consult, keep vancomycin 10-20.


Continue Zosyn IV for now


Will adjust antibiotics soon once culture data available


Follow-up blood cultures and wound cultures





Will follow.





Ana Dee MD


Infectious Diseases Consultant 


[facility] ([facility]


M [phone]


O [phone]

## 2020-06-27 LAB
%HYPO/RBC NFR BLD AUTO: (no result) %
BAND NEUTROPHILS # (MANUAL): 0.7 K/MM3
BUN SERPL-MCNC: 30 MG/DL (ref 7–17)
BUN/CREAT SERPL: 23 %
CALCIUM SERPL-MCNC: 7.6 MG/DL (ref 8.4–10.2)
HCT VFR BLD CALC: 25.9 % (ref 30.3–42.9)
HEMOLYSIS INDEX: 91
HGB BLD-MCNC: 7.9 GM/DL (ref 10.1–14.3)
INR PPP: 1.16 (ref 0.87–1.13)
MCHC RBC AUTO-ENTMCNC: 31 % (ref 30–34)
MCV RBC AUTO: 74 FL (ref 79–97)
MYELOCYTES # (MANUAL): 0 K/MM3
PLATELET # BLD: 200 K/MM3 (ref 140–440)
PROMYELOCYTES # (MANUAL): 0 K/MM3
RBC # BLD AUTO: 3.5 M/MM3 (ref 3.65–5.03)
TOTAL CELLS COUNTED BLD: 100

## 2020-06-27 RX ADMIN — PIPERACILLIN AND TAZOBACTAM SCH MLS/HR: 4; .5 INJECTION, POWDER, FOR SOLUTION INTRAVENOUS at 05:49

## 2020-06-27 RX ADMIN — HYDROMORPHONE HYDROCHLORIDE PRN MG: 1 INJECTION, SOLUTION INTRAMUSCULAR; INTRAVENOUS; SUBCUTANEOUS at 11:56

## 2020-06-27 RX ADMIN — SODIUM CHLORIDE SCH MLS/HR: 0.9 INJECTION, SOLUTION INTRAVENOUS at 05:53

## 2020-06-27 RX ADMIN — VANCOMYCIN HYDROCHLORIDE SCH MLS/HR: 5 INJECTION, POWDER, LYOPHILIZED, FOR SOLUTION INTRAVENOUS at 18:09

## 2020-06-27 RX ADMIN — Medication SCH: at 23:56

## 2020-06-27 RX ADMIN — PIPERACILLIN AND TAZOBACTAM SCH: 4; .5 INJECTION, POWDER, FOR SOLUTION INTRAVENOUS at 23:54

## 2020-06-27 RX ADMIN — INSULIN LISPRO SCH UNIT: 100 INJECTION, SOLUTION INTRAVENOUS; SUBCUTANEOUS at 17:26

## 2020-06-27 RX ADMIN — INSULIN LISPRO SCH UNIT: 100 INJECTION, SOLUTION INTRAVENOUS; SUBCUTANEOUS at 12:15

## 2020-06-27 RX ADMIN — INSULIN LISPRO SCH UNIT: 100 INJECTION, SOLUTION INTRAVENOUS; SUBCUTANEOUS at 08:38

## 2020-06-27 RX ADMIN — VANCOMYCIN HYDROCHLORIDE SCH MLS/HR: 5 INJECTION, POWDER, LYOPHILIZED, FOR SOLUTION INTRAVENOUS at 06:30

## 2020-06-27 RX ADMIN — CLINDAMYCIN IN 5 PERCENT DEXTROSE SCH: 12 INJECTION, SOLUTION INTRAVENOUS at 05:36

## 2020-06-27 RX ADMIN — Medication SCH ML: at 10:07

## 2020-06-27 RX ADMIN — PIPERACILLIN AND TAZOBACTAM SCH MLS/HR: 4; .5 INJECTION, POWDER, FOR SOLUTION INTRAVENOUS at 14:32

## 2020-06-27 RX ADMIN — INSULIN LISPRO SCH: 100 INJECTION, SOLUTION INTRAVENOUS; SUBCUTANEOUS at 23:54

## 2020-06-27 RX ADMIN — CLINDAMYCIN IN 5 PERCENT DEXTROSE SCH MLS/HR: 12 INJECTION, SOLUTION INTRAVENOUS at 05:37

## 2020-06-27 RX ADMIN — HYDROMORPHONE HYDROCHLORIDE PRN MG: 1 INJECTION, SOLUTION INTRAMUSCULAR; INTRAVENOUS; SUBCUTANEOUS at 12:09

## 2020-06-27 RX ADMIN — CLINDAMYCIN IN 5 PERCENT DEXTROSE SCH: 12 INJECTION, SOLUTION INTRAVENOUS at 23:55

## 2020-06-27 RX ADMIN — CLINDAMYCIN IN 5 PERCENT DEXTROSE SCH MLS/HR: 12 INJECTION, SOLUTION INTRAVENOUS at 14:46

## 2020-06-27 RX ADMIN — INSULIN LISPRO SCH UNIT: 100 INJECTION, SOLUTION INTRAVENOUS; SUBCUTANEOUS at 23:19

## 2020-06-27 RX ADMIN — INSULIN GLARGINE SCH UNITS: 100 INJECTION, SOLUTION SUBCUTANEOUS at 23:19

## 2020-06-27 RX ADMIN — MORPHINE SULFATE PRN MG: 2 INJECTION, SOLUTION INTRAMUSCULAR; INTRAVENOUS at 04:13

## 2020-06-27 NOTE — PROGRESS NOTE
Assessment and Plan





- Patient Problems


(1) Necrotizing fasciitis


Current Visit: Yes   Status: Acute   


Plan to address problem: 


Pt stable. s/p Excisional debridement of necrotizing fasciitis bilateral 

buttocks - 6/26 - POD#1.  Overall, patient appears to be showing signs of 

improvement.  She has been afebrile for the past 24 hours.  White count is 

slightly improved.  She does not appear ill.  Blood sugars are still not 

adequately controlled.





Dressing changes done at the bedside.  Overall, she did well with the dressing 

change.  The left buttock was more tender.  No signs of any residual purulent 

fluid, odor, or erythema were seen.  Nurse was present at bedside during 

dressing change.





Recommendation:


1.  nursing to take over twice daily dressing changes.  They have orders for 

premedication with Ativan and Dilaudid.  This seemed to work fairly well for the

patient.


2.  Needs tighter blood glucose control-defer to hospitalist


3.  Continue to follow labs and await results of wound cultures


4.  For now, it may be best for her to stay in IMCU so that she may receive IV 

Ativan and Dilaudid for the dressing changes.  As the wound heals, we will be 

able to transition over to oral medications and then she can be moved to the 

floor.





Please call with any questions








Subjective


Date of service: 06/27/20


Patient Reports: Positive: no new complaints





Objective


                               Vital Signs - 12hr











  06/27/20 06/27/20 06/27/20





  00:00 00:02 00:10


 


Temperature 98.8 F  


 


Pulse Rate  96 H 92 H


 


Pulse Rate [ 89  





From Monitor]   


 


Respiratory 28 H  19





Rate   


 


Blood Pressure  102/61 108/50


 


O2 Sat by Pulse 95  97





Oximetry   














  06/27/20 06/27/20 06/27/20





  00:20 00:30 00:40


 


Temperature   


 


Pulse Rate 83 85 85


 


Pulse Rate [   





From Monitor]   


 


Respiratory 21 21 18





Rate   


 


Blood Pressure 108/50 102/61 102/61


 


O2 Sat by Pulse 99 99 99





Oximetry   














  06/27/20 06/27/20 06/27/20





  00:50 01:00 01:10


 


Temperature   


 


Pulse Rate 83 83 85


 


Pulse Rate [   





From Monitor]   


 


Respiratory 18 17 19





Rate   


 


Blood Pressure 102/61 102/61 102/61


 


O2 Sat by Pulse 99 100 99





Oximetry   














  06/27/20 06/27/20 06/27/20





  01:20 01:30 01:40


 


Temperature   


 


Pulse Rate 86 86 87


 


Pulse Rate [   





From Monitor]   


 


Respiratory 18 18 19





Rate   


 


Blood Pressure 102/61 102/61 102/61


 


O2 Sat by Pulse 99 99 99





Oximetry   














  06/27/20 06/27/20 06/27/20





  01:50 02:00 02:10


 


Temperature   


 


Pulse Rate 88 87 84


 


Pulse Rate [   





From Monitor]   


 


Respiratory 18 27 H 18





Rate   


 


Blood Pressure 102/61 102/61 130/73


 


O2 Sat by Pulse 99 100 99





Oximetry   














  06/27/20 06/27/20 06/27/20





  02:20 02:30 02:40


 


Temperature   


 


Pulse Rate 82 82 84


 


Pulse Rate [   





From Monitor]   


 


Respiratory 18 17 18





Rate   


 


Blood Pressure 130/73 130/73 130/73


 


O2 Sat by Pulse 99 98 99





Oximetry   














  06/27/20 06/27/20 06/27/20





  02:50 03:00 03:10


 


Temperature   


 


Pulse Rate 84 85 82


 


Pulse Rate [   





From Monitor]   


 


Respiratory 17 17 18





Rate   


 


Blood Pressure 130/73 130/73 52/32


 


O2 Sat by Pulse 98 98 98





Oximetry   














  06/27/20 06/27/20 06/27/20





  03:20 03:30 03:40


 


Temperature   


 


Pulse Rate 83 86 85


 


Pulse Rate [   





From Monitor]   


 


Respiratory 17 16 19





Rate   


 


Blood Pressure 52/32 52/32 122/69


 


O2 Sat by Pulse 98 99 100





Oximetry   














  06/27/20 06/27/20 06/27/20





  03:50 04:00 04:05


 


Temperature  98.4 F 98.4 F


 


Pulse Rate  82 


 


Pulse Rate [  83 





From Monitor]   


 


Respiratory  24 





Rate   


 


Blood Pressure 122/69 123/69 


 


O2 Sat by Pulse 99 98 





Oximetry   














  06/27/20 06/27/20 06/27/20





  04:10 04:13 04:20


 


Temperature   


 


Pulse Rate 81  81


 


Pulse Rate [   





From Monitor]   


 


Respiratory 22 17 27 H





Rate   


 


Blood Pressure 123/69  123/69


 


O2 Sat by Pulse 100  100





Oximetry   














  06/27/20 06/27/20 06/27/20





  04:30 04:40 04:50


 


Temperature   


 


Pulse Rate 89 84 83


 


Pulse Rate [   





From Monitor]   


 


Respiratory 15 22 25 H





Rate   


 


Blood Pressure 123/69 123/69 123/69


 


O2 Sat by Pulse 100 100 100





Oximetry   














  06/27/20 06/27/20 06/27/20





  05:00 05:10 05:20


 


Temperature   


 


Pulse Rate 84 88 90


 


Pulse Rate [   





From Monitor]   


 


Respiratory 17 19 16





Rate   


 


Blood Pressure 123/66 123/66 123/66


 


O2 Sat by Pulse 100 100 100





Oximetry   














  06/27/20 06/27/20 06/27/20





  05:30 05:40 05:50


 


Temperature   


 


Pulse Rate 85 84 78


 


Pulse Rate [   





From Monitor]   


 


Respiratory 11 L 18 21





Rate   


 


Blood Pressure 123/66 123/66 123/66


 


O2 Sat by Pulse 99 97 97





Oximetry   














  06/27/20 06/27/20 06/27/20





  06:00 06:10 06:20


 


Temperature   


 


Pulse Rate 82 80 83


 


Pulse Rate [   





From Monitor]   


 


Respiratory 24 19 19





Rate   


 


Blood Pressure 123/66 90/61 90/61


 


O2 Sat by Pulse 98 96 96





Oximetry   














  06/27/20 06/27/20 06/27/20





  06:30 06:40 06:50


 


Temperature   


 


Pulse Rate 82 79 80


 


Pulse Rate [   





From Monitor]   


 


Respiratory 18 17 18





Rate   


 


Blood Pressure 90/61 90/61 90/61


 


O2 Sat by Pulse 96 96 96





Oximetry   














  06/27/20 06/27/20 06/27/20





  07:00 07:30 08:00


 


Temperature   97.5 F L


 


Pulse Rate 80 77 86


 


Pulse Rate [   





From Monitor]   


 


Respiratory 18 18 15





Rate   


 


Blood Pressure 90/61 90/61 90/61


 


O2 Sat by Pulse 97 100 94





Oximetry   














  06/27/20 06/27/20





  08:30 09:00


 


Temperature  


 


Pulse Rate  78


 


Pulse Rate [  





From Monitor]  


 


Respiratory  13





Rate  


 


Blood Pressure 133/71 133/71


 


O2 Sat by Pulse 91 100





Oximetry  














- General physical appearance


no distress, no pain





- Respiratory


normal expansion, normal respiratory effort





- Integumentary


other (Large wound on right buttock was relatively clean. No purulent fluid 

seen. Minimal tenderness. Left buttock was smaller wound. Tender with removal 

and packing of gauze. No erythema. No foul smell)





- Psychiatric


oriented to time, oriented to person, oriented to place, speech is normal, 

memory intact





- Labs





                                 06/27/20 05:30





                                 06/27/20 05:30


                                 Diabetes panel











  06/27/20 Range/Units





  05:30 


 


Sodium  131 L  (137-145)  mmol/L


 


Potassium  4.5  D  (3.6-5.0)  mmol/L


 


Chloride  101.8  ()  mmol/L


 


Carbon Dioxide  14 L D  (22-30)  mmol/L


 


BUN  30 H  (7-17)  mg/dL


 


Creatinine  1.3 H  (0.7-1.2)  mg/dL


 


Glucose  398 H  ()  mg/dL


 


Calcium  7.6 L  (8.4-10.2)  mg/dL








                                  Calcium panel











  06/27/20 Range/Units





  05:30 


 


Calcium  7.6 L  (8.4-10.2)  mg/dL








                                 Pituitary panel











  06/27/20 Range/Units





  05:30 


 


Sodium  131 L  (137-145)  mmol/L


 


Potassium  4.5  D  (3.6-5.0)  mmol/L


 


Chloride  101.8  ()  mmol/L


 


Carbon Dioxide  14 L D  (22-30)  mmol/L


 


BUN  30 H  (7-17)  mg/dL


 


Creatinine  1.3 H  (0.7-1.2)  mg/dL


 


Glucose  398 H  ()  mg/dL


 


Calcium  7.6 L  (8.4-10.2)  mg/dL








                                  Adrenal panel











  06/27/20 Range/Units





  05:30 


 


Sodium  131 L  (137-145)  mmol/L


 


Potassium  4.5  D  (3.6-5.0)  mmol/L


 


Chloride  101.8  ()  mmol/L


 


Carbon Dioxide  14 L D  (22-30)  mmol/L


 


BUN  30 H  (7-17)  mg/dL


 


Creatinine  1.3 H  (0.7-1.2)  mg/dL


 


Glucose  398 H  ()  mg/dL


 


Calcium  7.6 L  (8.4-10.2)  mg/dL

## 2020-06-27 NOTE — PROGRESS NOTE
Assessment and Plan


Assessment and plan: 





Extensive necrotizing fasciitis of right buttock and portion of left buttock..  

Surgery and ID following





Sepsis.  Present on admission.  Etiology secondary to above.  Patient meets 

criteria given the fever, leukocytosis and diagnosis of necrotizing fasciitis





Leukocytosis.  Etiology secondary to above.  Continue to follow CBC.





Diabetes mellitus type 2.  Continue Accu-Cheks and sliding scale insulin.  Tight

glycemic control to promote wound healing.





Hypertension.  Resume antihypertensive medications.





Hypokalemia.  Replete potassium.





6/27/2020.  Patient with excisional debridement of necrotizing fasciitis of 

bilateral buttocks completed on 6/26/2020.  Continue clindamycin, Zosyn and 

vancomycin per ID recommendations.  Follow-up blood cultures and wound cultures.

 Continue wound care.  Patient with elevated BG in the 350s.  We will start 

Lantus at bedtime 10 units.  70/30 insulin 10 units x 1 now.





History


Interval history: 





No new issues overnight.





Hospitalist Physical





- Constitutional


Vitals: 


                                        











Temp Pulse Resp BP Pulse Ox


 


 97.5 F L  78   13   133/71   100 


 


 06/27/20 08:00  06/27/20 09:00  06/27/20 09:00  06/27/20 09:00  06/27/20 09:00











General appearance: Present: no acute distress, well-nourished





- EENT


Eyes: Present: PERRL, EOM intact


ENT: hearing intact, clear oral mucosa, dentition normal





- Neck


Neck: Present: supple, normal ROM





- Respiratory


Respiratory effort: normal


Respiratory: bilateral: CTA





- Cardiovascular


Rhythm: regular


Heart Sounds: Present: S1 & S2.  Absent: gallop, rub





- Extremities


Extremities: no ischemia, No edema, Full ROM





- Abdominal


General gastrointestinal: soft, non-tender, non-distended, normal bowel sounds





- Integumentary


Integumentary: Present: clear, warm, dry





- Neurologic


Neurologic: CNII-XII intact, moves all extremities





HEART Score





- HEART Score


EKG: Normal


Age: < 45


Risk factors: 1-2 risk factors





Results





- Labs


CBC & Chem 7: 


                                 06/27/20 05:30





                                 06/27/20 05:30


Labs: 


                             Laboratory Last Values











WBC  36.8 K/mm3 (4.5-11.0)  H  06/27/20  05:30    


 


RBC  3.50 M/mm3 (3.65-5.03)  L  06/27/20  05:30    


 


Hgb  7.9 gm/dl (10.1-14.3)  L  06/27/20  05:30    


 


Hct  25.9 % (30.3-42.9)  L  06/27/20  05:30    


 


MCV  74 fl (79-97)  L  06/27/20  05:30    


 


MCH  23 pg (28-32)  L  06/27/20  05:30    


 


MCHC  31 % (30-34)   06/27/20  05:30    


 


RDW  19.3 % (13.2-15.2)  H  06/27/20  05:30    


 


Plt Count  200 K/mm3 (140-440)   06/27/20  05:30    


 


Add Manual Diff  Complete   06/27/20  05:30    


 


Total Counted  100   06/27/20  05:30    


 


Seg Neutrophils %  Np   06/26/20  00:21    


 


Seg Neuts % (Manual)  84.0 % (40.0-70.0)  H  06/27/20  05:30    


 


Band Neutrophils %  2.0 %  06/27/20  05:30    


 


Lymphocytes % (Manual)  11.0 % (13.4-35.0)  L  06/27/20  05:30    


 


Reactive Lymphs % (Man)  0 %  06/27/20  05:30    


 


Monocytes % (Manual)  3.0 % (0.0-7.3)   06/27/20  05:30    


 


Eosinophils % (Manual)  0 % (0.0-4.3)   06/27/20  05:30    


 


Basophils % (Manual)  0 % (0.0-1.8)   06/27/20  05:30    


 


Metamyelocytes %  0 %  06/27/20  05:30    


 


Myelocytes %  0 %  06/27/20  05:30    


 


Promyelocytes %  0 %  06/27/20  05:30    


 


Blast Cells %  0 %  06/27/20  05:30    


 


Nucleated RBC %  Not Reportable   06/27/20  05:30    


 


Seg Neutrophils # Man  30.9 K/mm3 (1.8-7.7)  H  06/27/20  05:30    


 


Band Neutrophils #  0.7 K/mm3  06/27/20  05:30    


 


Lymphocytes # (Manual)  4.0 K/mm3 (1.2-5.4)   06/27/20  05:30    


 


Abs React Lymphs (Man)  0.0 K/mm3  06/27/20  05:30    


 


Monocytes # (Manual)  1.1 K/mm3 (0.0-0.8)  H  06/27/20  05:30    


 


Eosinophils # (Manual)  0.0 K/mm3 (0.0-0.4)   06/27/20  05:30    


 


Basophils # (Manual)  0.0 K/mm3 (0.0-0.1)   06/27/20  05:30    


 


Metamyelocytes #  0.0 K/mm3  06/27/20  05:30    


 


Myelocytes #  0.0 K/mm3  06/27/20  05:30    


 


Promyelocytes #  0.0 K/mm3  06/27/20  05:30    


 


Blast Cells #  0.0 K/mm3  06/27/20  05:30    


 


WBC Morphology  Not Reportable   06/27/20  05:30    


 


Hypersegmented Neuts  Not Reportable   06/27/20  05:30    


 


Hyposegmented Neuts  Not Reportable   06/27/20  05:30    


 


Hypogranular Neuts  Not Reportable   06/27/20  05:30    


 


Smudge Cells  Not Reportable   06/27/20  05:30    


 


Toxic Granulation  Not Reportable   06/27/20  05:30    


 


Toxic Vacuolation  Not Reportable   06/27/20  05:30    


 


Dohle Bodies  Not Reportable   06/27/20  05:30    


 


Pelger-Huet Anomaly  Not Reportable   06/27/20  05:30    


 


Jeanie Rods  Not Reportable   06/27/20  05:30    


 


Platelet Estimate  Consistent w auto   06/27/20  05:30    


 


Clumped Platelets  Not Reportable   06/27/20  05:30    


 


Plt Clumps, EDTA  Not Reportable   06/27/20  05:30    


 


Large Platelets  Not Reportable   06/27/20  05:30    


 


Giant Platelets  Not Reportable   06/27/20  05:30    


 


Platelet Satelliting  Not Reportable   06/27/20  05:30    


 


Plt Morphology Comment  Not Reportable   06/27/20  05:30    


 


RBC Morphology  Not Reportable   06/27/20  05:30    


 


Dimorphic RBCs  Not Reportable   06/27/20  05:30    


 


Polychromasia  Not Reportable   06/27/20  05:30    


 


Hypochromasia  2+   06/27/20  05:30    


 


Poikilocytosis  Not Reportable   06/27/20  05:30    


 


Anisocytosis  Few   06/27/20  05:30    


 


Microcytosis  Few   06/27/20  05:30    


 


Macrocytosis  Not Reportable   06/27/20  05:30    


 


Spherocytes  Not Reportable   06/27/20  05:30    


 


Pappenheimer Bodies  Not Reportable   06/27/20  05:30    


 


Sickle Cells  Not Reportable   06/27/20  05:30    


 


Target Cells  Few   06/27/20  05:30    


 


Tear Drop Cells  Not Reportable   06/27/20  05:30    


 


Ovalocytes  Not Reportable   06/27/20  05:30    


 


Helmet Cells  Not Reportable   06/27/20  05:30    


 


Judge-Mitiwanga Bodies  Not Reportable   06/27/20  05:30    


 


Cabot Rings  Not Reportable   06/27/20  05:30    


 


Jina Cells  Few   06/27/20  05:30    


 


Bite Cells  Not Reportable   06/27/20  05:30    


 


Crenated Cell  Not Reportable   06/27/20  05:30    


 


Elliptocytes  Not Reportable   06/27/20  05:30    


 


Acanthocytes (Spur)  Not Reportable   06/27/20  05:30    


 


Rouleaux  Not Reportable   06/27/20  05:30    


 


Hemoglobin C Crystals  Not Reportable   06/27/20  05:30    


 


Schistocytes  Not Reportable   06/27/20  05:30    


 


Malaria parasites  Not Reportable   06/27/20  05:30    


 


Ayad Bodies  Not Reportable   06/27/20  05:30    


 


Hem Pathologist Commnt  No   06/27/20  05:30    


 


PT  14.9 Sec. (12.2-14.9)   06/27/20  05:30    


 


INR  1.16  (0.87-1.13)  H  06/27/20  05:30    


 


Sodium  131 mmol/L (137-145)  L  06/27/20  05:30    


 


Potassium  4.5 mmol/L (3.6-5.0)  D 06/27/20  05:30    


 


Chloride  101.8 mmol/L ()   06/27/20  05:30    


 


Carbon Dioxide  14 mmol/L (22-30)  L D 06/27/20  05:30    


 


Anion Gap  20 mmol/L  06/27/20  05:30    


 


BUN  30 mg/dL (7-17)  H  06/27/20  05:30    


 


Creatinine  1.3 mg/dL (0.7-1.2)  H  06/27/20  05:30    


 


Estimated GFR  55 ml/min  06/27/20  05:30    


 


BUN/Creatinine Ratio  23 %  06/27/20  05:30    


 


Glucose  398 mg/dL ()  H  06/27/20  05:30    


 


POC Glucose  461  ()  H  06/27/20  08:40    


 


Hemoglobin A1c  8.9 % (4-6)  H  06/26/20  00:21    


 


Lactic Acid  1.70 mmol/L (0.7-2.0)   06/26/20  01:46    


 


Calcium  7.6 mg/dL (8.4-10.2)  L  06/27/20  05:30    


 


HCG, Qual  Negative  (Negative)   06/26/20  00:21    











Microbiology: 


Microbiology





06/26/20 02:33   Peripheral/Venous   Blood Culture - Preliminary


                            NO GROWTH AFTER 24 HOURS


06/26/20 01:46   Peripheral/Venous   Blood Culture - Preliminary


                            NO GROWTH AFTER 24 HOURS








Liz/IV: 


                                        





Voiding Method                   Toilet


IV Catheter Type [Left           INT / Saline Lock


Antecubital]                     











Active Medications





- Current Medications


Current Medications: 














Generic Name Dose Route Start Last Admin





  Trade Name Freq  PRN Reason Stop Dose Admin


 


Acetaminophen  650 mg  06/26/20 03:58  06/26/20 14:32





  Tylenol  PO   650 mg





  Q4H PRN   Administration





  Pain MILD(1-3)/Fever >100.5/HA  


 


Dextrose  0 ml  06/26/20 03:58 





  D50w (25gm) Syringe  IV  





  Q30MIN PRN  





  Hypoglycemia  





  Protocol  


 


Hydromorphone HCl  0.5 mg  06/26/20 10:46 





  Dilaudid  IV  





  Q10MIN PRN  





  Pain , Severe (7-10)  


 


Hydromorphone HCl  1 mg  06/26/20 13:06 





  Dilaudid  IV  





  Q3H PRN  





  Pain , Severe (7-10)  


 


Sodium Chloride  1,000 mls @ 125 mls/hr  06/26/20 04:00  06/27/20 05:53





  Nacl 0.9% 1000 Ml  IV   125 mls/hr





  AS DIRECT JACKIE   Administration


 


Piperacillin Sod/Tazobactam Sod  4.5 gm in 100 mls @ 200 mls/hr  06/26/20 06:00 

06/27/20 05:49





  Zosyn/Ns 4.5gm/100ml  IV   200 mls/hr





  Q8HR JACKIE   Administration





  Protocol  


 


Sodium Chloride  1,000 mls @ 42 mls/hr  06/26/20 11:30  06/26/20 11:05





  Nacl 0.9% 1000 Ml  IV   42 mls/hr





  AS DIRECT JACKIE   Administration


 


Clindamycin HCl  600 mg in 50 mls @ 100 mls/hr  06/26/20 17:00  06/27/20 05:37





  Cleocin 600 Mg/50 Ml  IV   100 mls/hr





  Q8HR JACKIE   Administration





  Protocol  


 


Vancomycin HCl 1,250 mg/  275 mls @ 166.667 mls/hr  06/27/20 06:00  06/27/20 

06:30





  Sodium Chloride  IV   166.667 mls/hr





  Q12H JACKIE   Administration


 


Insulin Glargine  10 units  06/27/20 22:00 





  Lantus  SUB-Q  





  QHS JACKIE  


 


Insulin Human Lispro  0 unit  06/26/20 07:30  06/27/20 08:38





  Humalog  SUB-Q   8 unit





  ACHS JACKIE   Administration





  Protocol  


 


Morphine Sulfate  2 mg  06/26/20 03:58  06/27/20 04:13





  Morphine  IV   2 mg





  Q4H PRN   Administration





  Pain, Moderate (4-6)  


 


Ondansetron HCl  4 mg  06/26/20 03:58  06/27/20 10:06





  Zofran  IV   4 mg





  Q8H PRN   Administration





  Nausea And Vomiting  


 


Sodium Chloride  10 ml  06/26/20 10:00  06/27/20 10:07





  Sodium Chloride Flush Syringe 10 Ml  IV   10 ml





  BID JACKIE   Administration


 


Sodium Chloride  10 ml  06/26/20 03:58 





  Sodium Chloride Flush Syringe 10 Ml  IV  





  PRN PRN  





  LINE FLUSH  


 


Sodium Hypochlorite  1 applic  06/27/20 08:00 





  Dakin's Half Strength  TP  





  Q12H PRN  





  Wound Care

## 2020-06-28 RX ADMIN — INSULIN LISPRO SCH UNIT: 100 INJECTION, SOLUTION INTRAVENOUS; SUBCUTANEOUS at 10:59

## 2020-06-28 RX ADMIN — CLINDAMYCIN IN 5 PERCENT DEXTROSE SCH MLS/HR: 12 INJECTION, SOLUTION INTRAVENOUS at 14:05

## 2020-06-28 RX ADMIN — PIPERACILLIN AND TAZOBACTAM SCH: 4; .5 INJECTION, POWDER, FOR SOLUTION INTRAVENOUS at 05:29

## 2020-06-28 RX ADMIN — CLINDAMYCIN IN 5 PERCENT DEXTROSE SCH: 12 INJECTION, SOLUTION INTRAVENOUS at 05:28

## 2020-06-28 RX ADMIN — PIPERACILLIN AND TAZOBACTAM SCH MLS/HR: 4; .5 INJECTION, POWDER, FOR SOLUTION INTRAVENOUS at 14:57

## 2020-06-28 RX ADMIN — INSULIN LISPRO SCH: 100 INJECTION, SOLUTION INTRAVENOUS; SUBCUTANEOUS at 22:26

## 2020-06-28 RX ADMIN — CLINDAMYCIN HYDROCHLORIDE SCH MG: 300 CAPSULE ORAL at 05:30

## 2020-06-28 RX ADMIN — INSULIN GLARGINE SCH UNITS: 100 INJECTION, SOLUTION SUBCUTANEOUS at 22:08

## 2020-06-28 RX ADMIN — HYDROCODONE BITARTRATE AND ACETAMINOPHEN PRN EACH: 5; 325 TABLET ORAL at 06:45

## 2020-06-28 RX ADMIN — PIPERACILLIN AND TAZOBACTAM SCH MLS/HR: 4; .5 INJECTION, POWDER, FOR SOLUTION INTRAVENOUS at 22:16

## 2020-06-28 RX ADMIN — SODIUM CHLORIDE SCH MLS/HR: 0.9 INJECTION, SOLUTION INTRAVENOUS at 17:50

## 2020-06-28 RX ADMIN — INSULIN HUMAN SCH UNIT: 100 INJECTION, SUSPENSION SUBCUTANEOUS at 17:44

## 2020-06-28 RX ADMIN — INSULIN LISPRO SCH UNIT: 100 INJECTION, SOLUTION INTRAVENOUS; SUBCUTANEOUS at 12:19

## 2020-06-28 RX ADMIN — CLINDAMYCIN HYDROCHLORIDE SCH MG: 300 CAPSULE ORAL at 00:04

## 2020-06-28 RX ADMIN — VANCOMYCIN HYDROCHLORIDE SCH MLS/HR: 5 INJECTION, POWDER, LYOPHILIZED, FOR SOLUTION INTRAVENOUS at 23:31

## 2020-06-28 RX ADMIN — Medication SCH ML: at 22:05

## 2020-06-28 RX ADMIN — INSULIN LISPRO SCH UNIT: 100 INJECTION, SOLUTION INTRAVENOUS; SUBCUTANEOUS at 17:43

## 2020-06-28 RX ADMIN — VANCOMYCIN HYDROCHLORIDE SCH: 5 INJECTION, POWDER, LYOPHILIZED, FOR SOLUTION INTRAVENOUS at 05:39

## 2020-06-28 RX ADMIN — HYDROMORPHONE HYDROCHLORIDE PRN MG: 1 INJECTION, SOLUTION INTRAMUSCULAR; INTRAVENOUS; SUBCUTANEOUS at 16:07

## 2020-06-28 RX ADMIN — HYDROMORPHONE HYDROCHLORIDE PRN MG: 1 INJECTION, SOLUTION INTRAMUSCULAR; INTRAVENOUS; SUBCUTANEOUS at 10:59

## 2020-06-28 RX ADMIN — VANCOMYCIN HYDROCHLORIDE SCH MLS/HR: 5 INJECTION, POWDER, LYOPHILIZED, FOR SOLUTION INTRAVENOUS at 09:39

## 2020-06-28 RX ADMIN — CLINDAMYCIN IN 5 PERCENT DEXTROSE SCH MLS/HR: 12 INJECTION, SOLUTION INTRAVENOUS at 22:10

## 2020-06-28 RX ADMIN — Medication SCH ML: at 10:59

## 2020-06-28 RX ADMIN — HYDROCODONE BITARTRATE AND ACETAMINOPHEN PRN EACH: 5; 325 TABLET ORAL at 00:08

## 2020-06-28 RX ADMIN — HYDROMORPHONE HYDROCHLORIDE PRN MG: 1 INJECTION, SOLUTION INTRAMUSCULAR; INTRAVENOUS; SUBCUTANEOUS at 22:04

## 2020-06-28 NOTE — PROGRESS NOTE
Assessment and Plan








 





Cultures:


Blood culture 6/26/2020 no growth today.


OR culture 6/26/2020 pending





Assessment: 40 years old female with history of asthma, diabetes type 2, left 

CVA x2, admitted on 6/25/2020 due to severe right buttocks pain:





#Severe sepsis: remains with leukocytosis; likely due to necrotizing fasciitis 

of the right and left buttocks





#Right and left buttocks necrotizing fasciitis: Unclear etiology, so far blood 

cultures are negative.  Underwent extensive debridement.





#Anemia





#Uncontrolled diabetes:  A1c 8.9.  Glucose 332.





Recommendations:


monitor leukocytosis 


continue clindamycin 600 g IV every 8 hours for 72h


continue vancomycin with PK consult, keep vancomycin 10-20.


continue Zosyn IV for now


Will adjust antibiotics soon once culture data available


Follow-up blood cultures and wound cultures





Will follow.





Ana Dee MD


Infectious Diseases Consultant 


[facility] ([facility])


M [phone]


O [phone]





Subjective


Date of service: 06/28/20


Principal diagnosis: nec fascitis


Interval history: 


Feels better, no fever, pain under control








Objective





- Exam


Narrative Exam: 











General appearance: Alert in NAD on NC O2


Eyes: anicteric sclerae, moist conjunctivae; no lid-lag; PERRLA 


HENT: Atraumatic; oropharynx clear partiale dentulous


Lungs: CTA, with normal respiratory effort and no intercostal retractions 


CV: RRR no murmur 


Abdomen: Soft, non-tender; no masses or hepatosplenomegaly


Extremities: no edema, no cyanosis


Skin: Sami buttocks surgical dressings


Psych: Appropriate affect, alert and oriented to person, place and time. 


Neuro: alert and oriented x 3. Moving all extermities








 





- Constitutional


Vitals: 


                                   Vital Signs











Temp Pulse Resp BP Pulse Ox


 


 97.5 F L  74   20   132/63   90 


 


 06/28/20 08:00  06/28/20 09:30  06/28/20 09:30  06/28/20 09:30  06/28/20 09:30








                           Temperature -Last 24 Hours











Temperature                    97.5 F


 


Temperature                    98.8 F


 


Temperature                    98.2 F


 


Temperature                    98.7 F


 


Temperature                    98.8 F

















- Labs


CBC & Chem 7: 


                                 06/27/20 05:30





                                 06/27/20 05:30


Labs: 


                              Abnormal lab results











  06/27/20 06/27/20 06/28/20 Range/Units





  16:40 23:23 08:07 


 


POC Glucose  275 H  330 H  162 H  ()  














  06/28/20 Range/Units





  11:28 


 


POC Glucose  198 H  ()

## 2020-06-28 NOTE — PROGRESS NOTE
Assessment and Plan





- Patient Problems


(1) Necrotizing fasciitis


Current Visit: Yes   Status: Acute   


Plan to address problem: 


Pt stable. s/p Excisional debridement of necrotizing fasciitis bilateral 

buttocks - 6/26 - POD#2.  Overall, patient appears to be showing signs of 

improvement.  She has been afebrile for the past 48 hours.    She does not 

appear ill.  Blood sugars are still not adequately controlled.





Dressing change not done last night due to lack of IV access.  PICC line has 

been placed this morning.  Dressing change will be done by nursing staff.  I 

want to see if they would be able to do it in order to plan for her to move to 

the floor.  Discussed plan with nurse.  In regards to the severe burning with 

the Dakin's solution yesterday, we will switch to saline.  We will monitor over 

the next few days to see if that is sufficient.





Blood sugars appear better this morning.





Recommendation:


1.  nursing to take over twice daily dressing changes.  They have orders for 

premedication with Ativan and Dilaudid.  This seemed to work fairly well for the

patient.


2.  Tight Blood sugar control


3.  Continue to follow labs and await results of wound cultures


4.  For now, it may be best for her to stay in [facility] so that she may receive IV 

Ativan and Dilaudid for the dressing changes.  As the wound heals, we will be 

able to transition over to oral medications and then she can be moved to the 

floor.





Please call with any questions








Subjective


Date of service: 06/28/20


Patient Reports: Positive: other (nurse reports that patient had a lot of pain 

in the left buttock after the dressing change. Complained of severe burning)





Objective


                               Vital Signs - 12hr











  06/28/20 06/28/20 06/28/20





  02:00 02:30 03:00


 


Temperature   


 


Pulse Rate 75 78 72


 


Respiratory 18 17 17





Rate   


 


Blood Pressure 129/69 129/82 129/82


 


O2 Sat by Pulse 98 98 97





Oximetry   














  06/28/20 06/28/20 06/28/20





  03:30 04:00 04:30


 


Temperature  98.8 F 


 


Pulse Rate 72 68 68


 


Respiratory 17 17 14





Rate   


 


Blood Pressure 114/62 114/62 156/75


 


O2 Sat by Pulse 96 99 99





Oximetry   














  06/28/20 06/28/20 06/28/20





  05:00 05:30 06:00


 


Temperature   


 


Pulse Rate 70 70 69


 


Respiratory 18 16 16





Rate   


 


Blood Pressure 156/75 136/71 136/71


 


O2 Sat by Pulse 99 99 97





Oximetry   














  06/28/20 06/28/20 06/28/20





  06:40 07:00 07:30


 


Temperature   


 


Pulse Rate 82 72 72


 


Respiratory  21 19





Rate   


 


Blood Pressure 123/64 123/64 123/64


 


O2 Sat by Pulse 94 91 90





Oximetry   














  06/28/20 06/28/20 06/28/20





  08:00 08:30 09:00


 


Temperature 97.5 F L  


 


Pulse Rate 76 80 76


 


Respiratory 16 17 20





Rate   


 


Blood Pressure 123/64 102/66 102/66


 


O2 Sat by Pulse 88 93 85





Oximetry   














  06/28/20





  09:30


 


Temperature 


 


Pulse Rate 74


 


Respiratory 20





Rate 


 


Blood Pressure 132/63


 


O2 Sat by Pulse 90





Oximetry 














- General physical appearance


no distress, no pain, other (looks well, but tired)





- Respiratory


normal expansion, normal respiratory effort





- Rectum


other (dressings intact)





- Labs





                                 06/27/20 05:30





                                 06/27/20 05:30

## 2020-06-28 NOTE — PROGRESS NOTE
Assessment and Plan


Assessment and plan: 





Extensive necrotizing fasciitis of right buttock and portion of left buttock..  

Surgery and ID following





Sepsis.  Present on admission.  Etiology secondary to above.  Patient meets 

criteria given the fever, leukocytosis and diagnosis of necrotizing fasciitis





Leukocytosis.  Etiology secondary to above.  Continue to follow CBC.





Diabetes mellitus type 2.  Continue Accu-Cheks and sliding scale insulin.  Tight

glycemic control to promote wound healing.





Hypertension.  Resume antihypertensive medications.





Hypokalemia.  Replete potassium.





6/27/2020.  Patient with excisional debridement of necrotizing fasciitis of 

bilateral buttocks completed on 6/26/2020.  Continue clindamycin, Zosyn and 

vancomycin per ID recommendations.  Follow-up blood cultures and wound cultures.

 Continue wound care.  Patient with elevated BG in the 350s.  We will start 

Lantus at bedtime 10 units.  70/30 insulin 10 units x 1 now.





6/20/2020.  Patient s/p excisional debridement of necrotizing fasciitis of 

bilateral buttocks.  Continue antibiotics per ID recommendations.  PICC line 

placement.  Increase Lantus for better glycemic control.  Follow-up wound 

culture results.  Likely transfer to the floor





History


Interval history: 





No new issues overnight.





Hospitalist Physical





- Constitutional


Vitals: 


                                        











Temp Pulse Resp BP Pulse Ox


 


 97.5 F L  74   20   132/63   90 


 


 06/28/20 08:00  06/28/20 09:30  06/28/20 09:30  06/28/20 09:30  06/28/20 09:30











General appearance: Present: no acute distress, well-nourished





- EENT


Eyes: Present: PERRL, EOM intact


ENT: hearing intact, clear oral mucosa, dentition normal





- Neck


Neck: Present: supple, normal ROM





- Respiratory


Respiratory effort: normal


Respiratory: bilateral: CTA





- Cardiovascular


Rhythm: regular


Heart Sounds: Present: S1 & S2.  Absent: gallop, rub





- Extremities


Extremities: no ischemia, No edema, Full ROM





- Abdominal


General gastrointestinal: soft, non-tender, non-distended, normal bowel sounds





- Integumentary


Integumentary: Present: clear, warm, dry





- Neurologic


Neurologic: CNII-XII intact, moves all extremities





HEART Score





- HEART Score


EKG: Normal


Age: < 45


Risk factors: 1-2 risk factors





Results





- Labs


CBC & Chem 7: 


                                 06/27/20 05:30





                                 06/27/20 05:30


Labs: 


                             Laboratory Last Values











WBC  36.8 K/mm3 (4.5-11.0)  H  06/27/20  05:30    


 


RBC  3.50 M/mm3 (3.65-5.03)  L  06/27/20  05:30    


 


Hgb  7.9 gm/dl (10.1-14.3)  L  06/27/20  05:30    


 


Hct  25.9 % (30.3-42.9)  L  06/27/20  05:30    


 


MCV  74 fl (79-97)  L  06/27/20  05:30    


 


MCH  23 pg (28-32)  L  06/27/20  05:30    


 


MCHC  31 % (30-34)   06/27/20  05:30    


 


RDW  19.3 % (13.2-15.2)  H  06/27/20  05:30    


 


Plt Count  200 K/mm3 (140-440)   06/27/20  05:30    


 


Add Manual Diff  Complete   06/27/20  05:30    


 


Total Counted  100   06/27/20  05:30    


 


Seg Neutrophils %  Np   06/26/20  00:21    


 


Seg Neuts % (Manual)  84.0 % (40.0-70.0)  H  06/27/20  05:30    


 


Band Neutrophils %  2.0 %  06/27/20  05:30    


 


Lymphocytes % (Manual)  11.0 % (13.4-35.0)  L  06/27/20  05:30    


 


Reactive Lymphs % (Man)  0 %  06/27/20  05:30    


 


Monocytes % (Manual)  3.0 % (0.0-7.3)   06/27/20  05:30    


 


Eosinophils % (Manual)  0 % (0.0-4.3)   06/27/20  05:30    


 


Basophils % (Manual)  0 % (0.0-1.8)   06/27/20  05:30    


 


Metamyelocytes %  0 %  06/27/20  05:30    


 


Myelocytes %  0 %  06/27/20  05:30    


 


Promyelocytes %  0 %  06/27/20  05:30    


 


Blast Cells %  0 %  06/27/20  05:30    


 


Nucleated RBC %  Not Reportable   06/27/20  05:30    


 


Seg Neutrophils # Man  30.9 K/mm3 (1.8-7.7)  H  06/27/20  05:30    


 


Band Neutrophils #  0.7 K/mm3  06/27/20  05:30    


 


Lymphocytes # (Manual)  4.0 K/mm3 (1.2-5.4)   06/27/20  05:30    


 


Abs React Lymphs (Man)  0.0 K/mm3  06/27/20  05:30    


 


Monocytes # (Manual)  1.1 K/mm3 (0.0-0.8)  H  06/27/20  05:30    


 


Eosinophils # (Manual)  0.0 K/mm3 (0.0-0.4)   06/27/20  05:30    


 


Basophils # (Manual)  0.0 K/mm3 (0.0-0.1)   06/27/20  05:30    


 


Metamyelocytes #  0.0 K/mm3  06/27/20  05:30    


 


Myelocytes #  0.0 K/mm3  06/27/20  05:30    


 


Promyelocytes #  0.0 K/mm3  06/27/20  05:30    


 


Blast Cells #  0.0 K/mm3  06/27/20  05:30    


 


WBC Morphology  Not Reportable   06/27/20  05:30    


 


Hypersegmented Neuts  Not Reportable   06/27/20  05:30    


 


Hyposegmented Neuts  Not Reportable   06/27/20  05:30    


 


Hypogranular Neuts  Not Reportable   06/27/20  05:30    


 


Smudge Cells  Not Reportable   06/27/20  05:30    


 


Toxic Granulation  Not Reportable   06/27/20  05:30    


 


Toxic Vacuolation  Not Reportable   06/27/20  05:30    


 


Dohle Bodies  Not Reportable   06/27/20  05:30    


 


Pelger-Huet Anomaly  Not Reportable   06/27/20  05:30    


 


Jeanie Rods  Not Reportable   06/27/20  05:30    


 


Platelet Estimate  Consistent w auto   06/27/20  05:30    


 


Clumped Platelets  Not Reportable   06/27/20  05:30    


 


Plt Clumps, EDTA  Not Reportable   06/27/20  05:30    


 


Large Platelets  Not Reportable   06/27/20  05:30    


 


Giant Platelets  Not Reportable   06/27/20  05:30    


 


Platelet Satelliting  Not Reportable   06/27/20  05:30    


 


Plt Morphology Comment  Not Reportable   06/27/20  05:30    


 


RBC Morphology  Not Reportable   06/27/20  05:30    


 


Dimorphic RBCs  Not Reportable   06/27/20  05:30    


 


Polychromasia  Not Reportable   06/27/20  05:30    


 


Hypochromasia  2+   06/27/20  05:30    


 


Poikilocytosis  Not Reportable   06/27/20  05:30    


 


Anisocytosis  Few   06/27/20  05:30    


 


Microcytosis  Few   06/27/20  05:30    


 


Macrocytosis  Not Reportable   06/27/20  05:30    


 


Spherocytes  Not Reportable   06/27/20  05:30    


 


Pappenheimer Bodies  Not Reportable   06/27/20  05:30    


 


Sickle Cells  Not Reportable   06/27/20  05:30    


 


Target Cells  Few   06/27/20  05:30    


 


Tear Drop Cells  Not Reportable   06/27/20  05:30    


 


Ovalocytes  Not Reportable   06/27/20  05:30    


 


Helmet Cells  Not Reportable   06/27/20  05:30    


 


Judge-Lauderdale Bodies  Not Reportable   06/27/20  05:30    


 


Cabot Rings  Not Reportable   06/27/20  05:30    


 


Alpine Cells  Few   06/27/20  05:30    


 


Bite Cells  Not Reportable   06/27/20  05:30    


 


Crenated Cell  Not Reportable   06/27/20  05:30    


 


Elliptocytes  Not Reportable   06/27/20  05:30    


 


Acanthocytes (Spur)  Not Reportable   06/27/20  05:30    


 


Rouleaux  Not Reportable   06/27/20  05:30    


 


Hemoglobin C Crystals  Not Reportable   06/27/20  05:30    


 


Schistocytes  Not Reportable   06/27/20  05:30    


 


Malaria parasites  Not Reportable   06/27/20  05:30    


 


Ayad Bodies  Not Reportable   06/27/20  05:30    


 


Hem Pathologist Commnt  No   06/27/20  05:30    


 


PT  14.9 Sec. (12.2-14.9)   06/27/20  05:30    


 


INR  1.16  (0.87-1.13)  H  06/27/20  05:30    


 


Sodium  131 mmol/L (137-145)  L  06/27/20  05:30    


 


Potassium  4.5 mmol/L (3.6-5.0)  D 06/27/20  05:30    


 


Chloride  101.8 mmol/L ()   06/27/20  05:30    


 


Carbon Dioxide  14 mmol/L (22-30)  L D 06/27/20  05:30    


 


Anion Gap  20 mmol/L  06/27/20  05:30    


 


BUN  30 mg/dL (7-17)  H  06/27/20  05:30    


 


Creatinine  1.3 mg/dL (0.7-1.2)  H  06/27/20  05:30    


 


Estimated GFR  55 ml/min  06/27/20  05:30    


 


BUN/Creatinine Ratio  23 %  06/27/20  05:30    


 


Glucose  398 mg/dL ()  H  06/27/20  05:30    


 


POC Glucose  330  ()  H  06/27/20  23:23    


 


Hemoglobin A1c  8.9 % (4-6)  H  06/26/20  00:21    


 


Lactic Acid  1.70 mmol/L (0.7-2.0)   06/26/20  01:46    


 


Calcium  7.6 mg/dL (8.4-10.2)  L  06/27/20  05:30    


 


HCG, Qual  Negative  (Negative)   06/26/20  00:21    











Microbiology: 


Microbiology





06/26/20 02:33   Peripheral/Venous   Blood Culture - Preliminary


                            NO GROWTH AFTER 48 HOURS


06/26/20 01:46   Peripheral/Venous   Blood Culture - Preliminary


                            NO GROWTH AFTER 48 HOURS


06/26/20 Unknown   Buttock   Surgical Culture - Preliminary








Liz/IV: 


                                        





Voiding Method                   Toilet


IV Catheter Type [Left           INT / Saline Lock


Antecubital]                     











Active Medications





- Current Medications


Current Medications: 














Generic Name Dose Route Start Last Admin





  Trade Name Freq  PRN Reason Stop Dose Admin


 


Acetaminophen  650 mg  06/26/20 03:58  06/26/20 14:32





  Tylenol  PO   650 mg





  Q4H PRN   Administration





  Pain MILD(1-3)/Fever >100.5/HA  


 


Acetaminophen/Hydrocodone Bitart  2 each  06/27/20 12:36  06/28/20 06:45





  [place] 5/325  PO   2 each





  Q6H PRN   Administration





  Pain, Moderate (4-6)  


 


Dextrose  0 ml  06/26/20 03:58 





  D50w (25gm) Syringe  IV  





  Q30MIN PRN  





  Hypoglycemia  





  Protocol  


 


Hydromorphone HCl  1 mg  06/26/20 13:06 





  Dilaudid  IV  





  Q3H PRN  





  Pain , Severe (7-10)  


 


Hydromorphone HCl  1 mg  06/27/20 11:30  06/27/20 12:09





  Dilaudid  IV   1 mg





  BID PRN   Administration





  Wound Care  


 


Sodium Chloride  1,000 mls @ 125 mls/hr  06/26/20 04:00  06/27/20 05:53





  Nacl 0.9% 1000 Ml  IV   125 mls/hr





  AS DIRECT JACKIE   Administration


 


Piperacillin Sod/Tazobactam Sod  4.5 gm in 100 mls @ 200 mls/hr  06/26/20 06:00 

06/28/20 05:29





  Zosyn/Ns 4.5gm/100ml  IV   Not Given





  Q8HR JACKIE  





  Protocol  


 


Sodium Chloride  1,000 mls @ 42 mls/hr  06/26/20 11:30  06/26/20 11:05





  Nacl 0.9% 1000 Ml  IV   42 mls/hr





  AS DIRECT JACKIE   Administration


 


Clindamycin HCl  600 mg in 50 mls @ 100 mls/hr  06/26/20 17:00  06/28/20 05:28





  Cleocin 600 Mg/50 Ml  IV   Not Given





  Q8HR [facility]  





  Protocol  


 


Vancomycin HCl 1,250 mg/  275 mls @ 166.667 mls/hr  06/28/20 22:00 





  Sodium Chloride  IV  





  Q12H JACKIE  


 


Insulin Glargine  10 units  06/27/20 22:00  06/27/20 23:19





  Lantus  SUB-Q   10 units





  QHS JACKIE   Administration


 


Insulin Human Lispro  0 unit  06/26/20 07:30  06/27/20 23:54





  Humalog  SUB-Q   Not Given





  ACHS [facility]  





  Protocol  


 


Metoclopramide HCl  10 mg  06/27/20 11:55 





  Reglan  IV  





  Q6H PRN  





  Nausea And Vomiting  


 


Morphine Sulfate  2 mg  06/26/20 03:58  06/27/20 04:13





  Morphine  IV   2 mg





  Q4H PRN   Administration





  Pain, Moderate (4-6)  


 


Ondansetron HCl  4 mg  06/26/20 03:58  06/27/20 10:06





  Zofran  IV   4 mg





  Q8H PRN   Administration





  Nausea And Vomiting  


 


Sodium Chloride  10 ml  06/26/20 10:00  06/27/20 23:56





  Sodium Chloride Flush Syringe 10 Ml  IV   Not Given





  BID JACKIE  


 


Sodium Chloride  10 ml  06/26/20 03:58 





  Sodium Chloride Flush Syringe 10 Ml  IV  





  PRN PRN  





  LINE FLUSH  


 


Sodium Hypochlorite  1 applic  06/27/20 08:00 





  Dakin's Half Strength  TP  





  Q12H PRN  





  Wound Care

## 2020-06-29 LAB
%HYPO/RBC NFR BLD AUTO: (no result) %
ANISOCYTOSIS BLD QL SMEAR: (no result)
BAND NEUTROPHILS # (MANUAL): 0 K/MM3
BURR CELLS BLD QL SMEAR: (no result)
HCT VFR BLD CALC: 20.9 % (ref 30.3–42.9)
HGB BLD-MCNC: 6.5 GM/DL (ref 10.1–14.3)
MCHC RBC AUTO-ENTMCNC: 31 % (ref 30–34)
MCV RBC AUTO: 70 FL (ref 79–97)
MYELOCYTES # (MANUAL): 0 K/MM3
PLATELET # BLD: 247 K/MM3 (ref 140–440)
PROMYELOCYTES # (MANUAL): 0 K/MM3
RBC # BLD AUTO: 2.99 M/MM3 (ref 3.65–5.03)
TARGETS BLD QL SMEAR: (no result)
TOTAL CELLS COUNTED BLD: 100

## 2020-06-29 RX ADMIN — CLINDAMYCIN IN 5 PERCENT DEXTROSE SCH MLS/HR: 12 INJECTION, SOLUTION INTRAVENOUS at 05:28

## 2020-06-29 RX ADMIN — HYDROCODONE BITARTRATE AND ACETAMINOPHEN PRN EACH: 5; 325 TABLET ORAL at 20:18

## 2020-06-29 RX ADMIN — HYDROMORPHONE HYDROCHLORIDE PRN MG: 1 INJECTION, SOLUTION INTRAMUSCULAR; INTRAVENOUS; SUBCUTANEOUS at 05:27

## 2020-06-29 RX ADMIN — HYDROCODONE BITARTRATE AND ACETAMINOPHEN PRN EACH: 5; 325 TABLET ORAL at 01:22

## 2020-06-29 RX ADMIN — DOXYCYCLINE HYCLATE SCH MG: 100 CAPSULE ORAL at 22:33

## 2020-06-29 RX ADMIN — DOXYCYCLINE HYCLATE SCH MG: 100 CAPSULE ORAL at 15:17

## 2020-06-29 RX ADMIN — INSULIN HUMAN SCH: 100 INJECTION, SUSPENSION SUBCUTANEOUS at 18:09

## 2020-06-29 RX ADMIN — INSULIN LISPRO SCH: 100 INJECTION, SOLUTION INTRAVENOUS; SUBCUTANEOUS at 10:26

## 2020-06-29 RX ADMIN — Medication SCH ML: at 22:34

## 2020-06-29 RX ADMIN — INSULIN LISPRO SCH: 100 INJECTION, SOLUTION INTRAVENOUS; SUBCUTANEOUS at 18:09

## 2020-06-29 RX ADMIN — VANCOMYCIN HYDROCHLORIDE SCH MLS/HR: 5 INJECTION, POWDER, LYOPHILIZED, FOR SOLUTION INTRAVENOUS at 10:38

## 2020-06-29 RX ADMIN — Medication SCH ML: at 10:27

## 2020-06-29 RX ADMIN — HYDROMORPHONE HYDROCHLORIDE PRN MG: 1 INJECTION, SOLUTION INTRAMUSCULAR; INTRAVENOUS; SUBCUTANEOUS at 10:20

## 2020-06-29 RX ADMIN — INSULIN LISPRO SCH: 100 INJECTION, SOLUTION INTRAVENOUS; SUBCUTANEOUS at 12:08

## 2020-06-29 RX ADMIN — INSULIN HUMAN SCH UNIT: 100 INJECTION, SUSPENSION SUBCUTANEOUS at 10:18

## 2020-06-29 RX ADMIN — PIPERACILLIN AND TAZOBACTAM SCH MLS/HR: 4; .5 INJECTION, POWDER, FOR SOLUTION INTRAVENOUS at 05:28

## 2020-06-29 NOTE — PROGRESS NOTE
Assessment and Plan








 





Cultures:


Blood culture 6/26/2020 no growth today.


OR culture 6/26/2020 pending





Assessment: 40 years old female with history of asthma, diabetes type 2, left 

CVA x2, admitted on 6/25/2020 due to severe right buttocks pain:





#Severe sepsis: remains with leukocytosis ? reactive due to severe anemia; 

likely due to necrotizing fasciitis of the right and left buttocks





#Right and left buttocks necrotizing fasciitis: Unclear etiology, so far blood 

cultures are negative.  Underwent extensive debridement on6/26/2020 culture NO 

growth.





#Anemia





#Uncontrolled diabetes:  A1c 8.9.  Glucose 332. Strict glycemic control.





Recommendations:


monitor leukocytosis 


stop clindamycin  


stop vancomycin and zosyn


start empiric levaquin 750 mg po, flagyl 500 mg po tid and doxycycline 100 mg po

bid total 7 days post debridement





Will follow.





Ana Dee MD


Infectious Diseases Consultant 


[facility] ([facility])


M [phone]


O [phone]





Subjective


Date of service: 06/29/20


Principal diagnosis: nec fascitis


Interval history: 


Feels better, no fever, pain under control








Objective





- Exam


Narrative Exam: 


General appearance: Alert in NAD on NC O2


Eyes: anicteric sclerae, moist conjunctivae; no lid-lag; PERRLA 


HENT: Atraumatic; oropharynx clear partiale dentulous


Lungs: CTA, with normal respiratory effort and no intercostal retractions 


CV: RRR no murmur 


Abdomen: Soft, non-tender; no masses or hepatosplenomegaly


Extremities: no edema, no cyanosis


Skin: Sami buttocks surgical dressings


Psych: Appropriate affect, alert and oriented to person, place and time. 


Neuro: alert and oriented x 3. Moving all extermities








 





- Constitutional


Vitals: 


                                   Vital Signs











Temp Pulse Resp BP Pulse Ox


 


 98.3 F   72   8 L  128/53   100 


 


 06/29/20 12:00  06/29/20 13:00  06/29/20 13:00  06/29/20 13:00  06/29/20 10:00








                           Temperature -Last 24 Hours











Temperature                    98.3 F


 


Temperature                    98.0 F


 


Temperature                    97.9 F


 


Temperature                    98.3 F


 


Temperature                    98.0 F


 


Temperature                    97.6 F

















- Labs


CBC & Chem 7: 


                                 06/29/20 05:30





                                 06/27/20 05:30


Labs: 


                              Abnormal lab results











  06/28/20 06/28/20 06/29/20 Range/Units





  17:00 21:36 05:30 


 


WBC    15.6 H  (4.5-11.0)  K/mm3


 


RBC    2.99 L  (3.65-5.03)  M/mm3


 


Hgb    6.5 L  (10.1-14.3)  gm/dl


 


Hct    20.9 L  (30.3-42.9)  %


 


MCV    70 L  (79-97)  fl


 


MCH    22 L  (28-32)  pg


 


RDW    18.1 H  (13.2-15.2)  %


 


Eosinophils % (Manual)    5.0 H  (0.0-4.3)  %


 


Seg Neutrophils # Man    10.3 H  (1.8-7.7)  K/mm3


 


Monocytes # (Manual)    1.1 H  (0.0-0.8)  K/mm3


 


Eosinophils # (Manual)    0.8 H  (0.0-0.4)  K/mm3


 


POC Glucose  198 H  111 H   ()  


 


Crossmatch     














  06/29/20 Range/Units





  10:40 


 


WBC   (4.5-11.0)  K/mm3


 


RBC   (3.65-5.03)  M/mm3


 


Hgb   (10.1-14.3)  gm/dl


 


Hct   (30.3-42.9)  %


 


MCV   (79-97)  fl


 


MCH   (28-32)  pg


 


RDW   (13.2-15.2)  %


 


Eosinophils % (Manual)   (0.0-4.3)  %


 


Seg Neutrophils # Man   (1.8-7.7)  K/mm3


 


Monocytes # (Manual)   (0.0-0.8)  K/mm3


 


Eosinophils # (Manual)   (0.0-0.4)  K/mm3


 


POC Glucose   ()  


 


Crossmatch  See Detail

## 2020-06-29 NOTE — PROGRESS NOTE
After Visit Summary   11/9/2017    Ailyn Trevino    MRN: [id number]           Patient Information     Date Of Birth          1935        Visit Information        Provider Department      11/9/2017 3:40 PM Dale Marquis MD [facility]         Follow-ups after your visit        Your next 10 appointments already scheduled     Nov 13, 2017  3:20 PM CST   ALEXSANDRA Extremity with Pj S Sondrol, PT   ALEXSANDRA FRIDLEY PT (AL[facility]SANDRA [place])    [address]  [address]  [place] [address] [phone]              Who to contact     If you have questions or need follow up information about today's clinic visit or your schedule please contact [facility] directly at [phone].  Normal or non-critical lab and imaging results will be communicated to you by MyChart, letter or phone within 4 business days after the clinic has received the results. If you do not hear from us within 7 days, please contact the clinic through A[employer] or phone. If you have a critical or abnormal lab result, we will notify you by phone as soon as possible.  Submit refill requests through [employer] or call your pharmacy and they will forward the refill request to us. Please allow 3 business days for your refill to be completed.          Additional Information About Your Visit        MyChart Information     [employer] gives you secure access to your electronic health record. If you see a primary care provider, you can also send messages to your care team and make appointments. If you have questions, please call your primary care clinic.  If you do not have a primary care provider, please call [phone] and they will assist you.        Care EveryWhere ID     This is your Care EveryWhere ID. This could be used by other organizations to access your [place] medical records  [id number]        Your Vitals Were     Pulse Temperature Pulse Oximetry BMI (Body Mass Index)          66 97.9  F (36.6  C) (Oral)  Assessment and Plan


Assessment and plan: 





Extensive necrotizing fasciitis of right buttock and portion of left buttock..  

Surgery and ID following





Sepsis.  Present on admission.  Etiology secondary to above.  Patient meets 

criteria given the fever, leukocytosis and diagnosis of necrotizing fasciitis





Leukocytosis.  Etiology secondary to above.  Continue to follow CBC.





Diabetes mellitus type 2.  Continue Accu-Cheks and sliding scale insulin.  Tight

glycemic control to promote wound healing.





Hypertension.  Resume antihypertensive medications.





Hypokalemia.  Replete potassium.





6/27/2020.  Patient with excisional debridement of necrotizing fasciitis of 

bilateral buttocks completed on 6/26/2020.  Continue clindamycin, Zosyn and 

vancomycin per ID recommendations.  Follow-up blood cultures and wound cultures.

 Continue wound care.  Patient with elevated BG in the 350s.  We will start 

Lantus at bedtime 10 units.  70/30 insulin 10 units x 1 now.





6/28/2020.  Patient s/p excisional debridement of necrotizing fasciitis of 

bilateral buttocks.  Continue antibiotics per ID recommendations.  PICC line 

placement.  Increase Lantus for better glycemic control.  Follow-up wound 

culture results.  Likely transfer to the floor.





6/29/2020.  Patient s/p excisional debridement of necrotizing fasciitis of 

bilateral buttocks.  Continue antibiotics per ID recommendations.  PICC line 

placed.  Continue dressing changes per wound care.  Patient will need wound VAC.

 Patient currently anemic with hemoglobin 6.5.  Patient refusing PRBCs at this 

time.  BG much better controlled.





History


Interval history: 





No new issues overnight.





Hospitalist Physical





- Constitutional


Vitals: 


                                        











Temp Pulse Resp BP Pulse Ox


 


 98.0 F   71   19   128/53   100 


 


 06/29/20 08:00  06/29/20 12:00  06/29/20 12:00  06/29/20 12:00  06/29/20 10:00











General appearance: Present: no acute distress, well-nourished





- EENT


Eyes: Present: PERRL, EOM intact


ENT: hearing intact, clear oral mucosa, dentition normal





- Neck


Neck: Present: supple, normal ROM





- Respiratory


Respiratory effort: normal


Respiratory: bilateral: CTA





- Cardiovascular


Rhythm: regular


Heart Sounds: Present: S1 & S2.  Absent: gallop, rub





- Extremities


Extremities: no ischemia, No edema, Full ROM





- Abdominal


General gastrointestinal: soft, non-tender, non-distended, normal bowel sounds





- Integumentary


Integumentary: Present: clear, warm, dry





- Neurologic


Neurologic: CNII-XII intact, moves all extremities





HEART Score





- HEART Score


EKG: Normal


Age: < 45


Risk factors: 1-2 risk factors





Results





- Labs


CBC & Chem 7: 


                                 06/29/20 05:30





                                 06/27/20 05:30


Labs: 


                             Laboratory Last Values











WBC  15.6 K/mm3 (4.5-11.0)  H  06/29/20  05:30    


 


RBC  2.99 M/mm3 (3.65-5.03)  L  06/29/20  05:30    


 


Hgb  6.5 gm/dl (10.1-14.3)  L  06/29/20  05:30    


 


Hct  20.9 % (30.3-42.9)  L  06/29/20  05:30    


 


MCV  70 fl (79-97)  L  06/29/20  05:30    


 


MCH  22 pg (28-32)  L  06/29/20  05:30    


 


MCHC  31 % (30-34)   06/29/20  05:30    


 


RDW  18.1 % (13.2-15.2)  H  06/29/20  05:30    


 


Plt Count  247 K/mm3 (140-440)   06/29/20  05:30    


 


Add Manual Diff  Complete   06/29/20  05:30    


 


Total Counted  100   06/29/20  05:30    


 


Seg Neutrophils %  Np   06/26/20  00:21    


 


Seg Neuts % (Manual)  66.0 % (40.0-70.0)   06/29/20  05:30    


 


Band Neutrophils %  0 %  06/29/20  05:30    


 


Lymphocytes % (Manual)  22.0 % (13.4-35.0)   06/29/20  05:30    


 


Reactive Lymphs % (Man)  0 %  06/29/20  05:30    


 


Monocytes % (Manual)  7.0 % (0.0-7.3)   06/29/20  05:30    


 


Eosinophils % (Manual)  5.0 % (0.0-4.3)  H  06/29/20  05:30    


 


Basophils % (Manual)  0 % (0.0-1.8)   06/29/20  05:30    


 


Metamyelocytes %  0 %  06/29/20  05:30    


 


Myelocytes %  0 %  06/29/20  05:30    


 


Promyelocytes %  0 %  06/29/20  05:30    


 


Blast Cells %  0 %  06/29/20  05:30    


 


Nucleated RBC %  Not Reportable   06/29/20  05:30    


 


Seg Neutrophils # Man  10.3 K/mm3 (1.8-7.7)  H  06/29/20  05:30    


 


Band Neutrophils #  0.0 K/mm3  06/29/20  05:30    


 


Lymphocytes # (Manual)  3.4 K/mm3 (1.2-5.4)   06/29/20  05:30    


 


Abs React Lymphs (Man)  0.0 K/mm3  06/29/20  05:30    


 


Monocytes # (Manual)  1.1 K/mm3 (0.0-0.8)  H  06/29/20  05:30    


 


Eosinophils # (Manual)  0.8 K/mm3 (0.0-0.4)  H  06/29/20  05:30    


 


Basophils # (Manual)  0.0 K/mm3 (0.0-0.1)   06/29/20  05:30    


 


Metamyelocytes #  0.0 K/mm3  06/29/20  05:30    


 


Myelocytes #  0.0 K/mm3  06/29/20  05:30    


 


Promyelocytes #  0.0 K/mm3  06/29/20  05:30    


 


Blast Cells #  0.0 K/mm3  06/29/20  05:30    


 


WBC Morphology  Not Reportable   06/29/20  05:30    


 


Hypersegmented Neuts  Not Reportable   06/29/20  05:30    


 


Hyposegmented Neuts  Not Reportable   06/29/20  05:30    


 


Hypogranular Neuts  Not Reportable   06/29/20  05:30    


 


Smudge Cells  Not Reportable   06/29/20  05:30    


 


Toxic Granulation  Not Reportable   06/29/20  05:30    


 


Toxic Vacuolation  Not Reportable   06/29/20  05:30    


 


Dohle Bodies  Not Reportable   06/29/20  05:30    


 


Pelger-Huet Anomaly  Not Reportable   06/29/20  05:30    


 


Jeanie Rods  Not Reportable   06/29/20  05:30    


 


Platelet Estimate  Consistent w auto   06/29/20  05:30    


 


Clumped Platelets  Not Reportable   06/29/20  05:30    


 


Plt Clumps, EDTA  Not Reportable   06/29/20  05:30    


 


Large Platelets  Not Reportable   06/29/20  05:30    


 


Giant Platelets  Not Reportable   06/29/20  05:30    


 


Platelet Satelliting  Not Reportable   06/29/20  05:30    


 


Plt Morphology Comment  Not Reportable   06/29/20  05:30    


 


RBC Morphology  Not Reportable   06/29/20  05:30    


 


Dimorphic RBCs  Not Reportable   06/29/20  05:30    


 


Polychromasia  Not Reportable   06/29/20  05:30    


 


Hypochromasia  2+   06/29/20  05:30    


 


Poikilocytosis  Not Reportable   06/29/20  05:30    


 


Anisocytosis  1+   06/29/20  05:30    


 


Microcytosis  1+   06/29/20  05:30    


 


Macrocytosis  Not Reportable   06/29/20  05:30    


 


Spherocytes  Not Reportable   06/29/20  05:30    


 


Pappenheimer Bodies  Not Reportable   06/29/20  05:30    


 


Sickle Cells  Not Reportable   06/29/20  05:30    


 


Target Cells  1+   06/29/20  05:30    


 


Tear Drop Cells  Not Reportable   06/29/20  05:30    


 


Ovalocytes  Not Reportable   06/29/20  05:30    


 


Helmet Cells  Few   06/29/20  05:30    


 


[place] Bodies  Not Reportable   06/29/20  05:30    


 


Cabot Rings  Not Reportable   06/29/20  05:30    


 


Jina Cells  1+   06/29/20  05:30    


 


Bite Cells  Not Reportable   06/29/20  05:30    


 


Crenated Cell  Not Reportable   06/29/20  05:30    


 


Elliptocytes  Not Reportable   06/29/20  05:30    


 


Acanthocytes (Spur)  Not Reportable   06/29/20  05:30    


 


Rouleaux  Not Reportable   06/29/20  05:30    


 


Hemoglobin C Crystals  Not Reportable   06/29/20  05:30    


 


Schistocytes  Not Reportable   06/29/20  05:30    


 


Malaria parasites  Not Reportable   06/29/20  05:30    


 


Ayad Bodies  Not Reportable   06/29/20  05:30    


 


Hem Pathologist Commnt  No   06/29/20  05:30    


 


PT  14.9 Sec. (12.2-14.9)   06/27/20  05:30    


 


INR  1.16  (0.87-1.13)  H  06/27/20  05:30    


 


Sodium  131 mmol/L (137-145)  L  06/27/20  05:30    


 


Potassium  4.5 mmol/L (3.6-5.0)  D 06/27/20  05:30    


 


Chloride  101.8 mmol/L ()   06/27/20  05:30    


 


Carbon Dioxide  14 mmol/L (22-30)  L D 06/27/20  05:30    


 


Anion Gap  20 mmol/L  06/27/20  05:30    


 


BUN  30 mg/dL (7-17)  H  06/27/20  05:30    


 


Creatinine  1.3 mg/dL (0.7-1.2)  H  06/27/20  05:30    


 


Estimated GFR  55 ml/min  06/27/20  05:30    


 


BUN/Creatinine Ratio  23 %  06/27/20  05:30    


 


Glucose  398 mg/dL ()  H  06/27/20  05:30    


 


POC Glucose  111  ()  H  06/28/20  21:36    


 


Hemoglobin A1c  8.9 % (4-6)  H  06/26/20  00:21    


 


Lactic Acid  1.70 mmol/L (0.7-2.0)   06/26/20  01:46    


 


Calcium  7.6 mg/dL (8.4-10.2)  L  06/27/20  05:30    


 


HCG, Qual  Negative  (Negative)   06/26/20  00:21    


 


Vancomycin Trough  14.3 ug/mL (5.0-20.0)   06/28/20  22:20    


 


Blood Type  O POSITIVE   06/29/20  10:40    


 


Crossmatch  See Detail   06/29/20  10:40    











Microbiology: 


Microbiology





06/26/20 Unknown   Buttock   Surgical Culture - Final


06/26/20 02:33   Peripheral/Venous   Blood Culture - Preliminary


                            NO GROWTH AFTER 72 HOURS


06/26/20 01:46   Peripheral/Venous   Blood Culture - Preliminary


                            NO GROWTH AFTER 72 HOURS


06/26/20 Unknown   Buttock   Anaerobic Culture - Preliminary








Liz/IV: 


                                        





Voiding Method                   Toilet


IV Catheter Type [Right Upper    PICC Line


arm]                             


IV Catheter Type [Left           INT / Saline Lock


Antecubital]                     











Active Medications





- Current Medications


Current Medications: 














Generic Name Dose Route Start Last Admin





  Trade Name Freq  PRN Reason Stop Dose Admin


 


Acetaminophen  650 mg  06/26/20 03:58  06/26/20 14:32





  Tylenol  PO   650 mg





  Q4H PRN   Administration





  Pain MILD(1-3)/Fever >100.5/HA  


 


Acetaminophen/Hydrocodone Bitart  2 each  06/27/20 12:36  06/29/20 01:22





  [place] 5/325  PO   2 each





  Q6H PRN   Administration





  Pain, Moderate (4-6)  


 


Dextrose  0 ml  06/26/20 03:58 





  D50w (25gm) Syringe  IV  





  Q30MIN PRN  





  Hypoglycemia  





  Protocol  


 


Hydromorphone HCl  1 mg  06/27/20 11:30  06/29/20 10:20





  Dilaudid  IV   1 mg





  BID PRN   Administration





  Wound Care  


 


Hydromorphone HCl  0.5 mg  06/29/20 08:29 





  Dilaudid  IV  





  Q3H PRN  





  Pain , Severe (7-10)  


 


Piperacillin Sod/Tazobactam Sod  4.5 gm in 100 mls @ 200 mls/hr  06/26/20 06:00 

06/29/20 05:28





  Zosyn/Ns 4.5gm/100ml  IV   200 mls/hr





  Q8HR JACKIE   Administration





  Protocol  


 


Clindamycin HCl  600 mg in 50 mls @ 100 mls/hr  06/26/20 17:00  06/29/20 05:28





  Cleocin 600 Mg/50 Ml  IV   100 mls/hr





  Q8HR JACKIE   Administration





  Protocol  


 


Vancomycin HCl 1,250 mg/  275 mls @ 166.667 mls/hr  06/28/20 22:00  06/29/20 

10:38





  Sodium Chloride  IV   166.667 mls/hr





  Q12H JACKIE   Administration


 


Sodium Chloride  500 mls @ 0 mls/hr  06/29/20 08:00 





  Nacl 0.9% 500 Ml  IV  06/29/20 23:59 





  ONCE NR  





  As Directed  


 


Insulin Glargine  10 units  06/27/20 22:00  06/28/20 22:08





  Lantus  SUB-Q   10 units





  QHS JACKIE   Administration


 


Insulin Human Isoph/Insulin Regular  10 unit  06/28/20 17:00  06/29/20 10:18





  Humulin 70/30  SUB-Q   10 unit





  BIDDIAB JACKIE   Administration


 


Insulin Human Lispro  0 unit  06/26/20 07:30  06/29/20 12:08





  Humalog  SUB-Q   Not Given





  [facility]  





  Protocol  


 


Metoclopramide HCl  10 mg  06/27/20 11:55 





  Reglan  IV  





  Q6H PRN  





  Nausea And Vomiting  


 


Ondansetron HCl  4 mg  06/26/20 03:58  06/27/20 10:06





  Zofran  IV   4 mg





  Q8H PRN   Administration





  Nausea And Vomiting  


 


Sodium Chloride  10 ml  06/26/20 10:00  06/29/20 10:27





  Sodium Chloride Flush Syringe 10 Ml  IV   10 ml





  BID JACKIE   Administration


 


Sodium Chloride  10 ml  06/26/20 03:58 





  Sodium Chloride Flush Syringe 10 Ml  IV  





  PRN PRN  





  LINE FLUSH  


 


Sodium Chloride  10 ml  06/28/20 13:56 





  Nacl 0.9% 500 Ml  IRRIGATION  





  BID PRN  





  Wound Care  


 


Sodium Hypochlorite  1 applic  06/29/20 22:00 





  Dakin's Half Strength  TP  





  BID JACKIE 95% 30.38 kg/m2         Blood Pressure from Last 3 Encounters:   11/09/17 126/54   10/18/17 117/46   10/13/17 104/62    Weight from Last 3 Encounters:   11/09/17 68.2 kg (150 lb 6.4 oz)   10/18/17 67.9 kg (149 lb 12.8 oz)   10/13/17 68 kg (150 lb)              Today, you had the following     No orders found for display       Primary Care Provider Office Phone # Fax #    Maddi Church [phone] [address] [facility] [address]        Equal Access to Services     [facility]: Hadii tammie guido hadasho Socesar, waaxda luqadaha, qaybta kaalmada adesravan, ayesha driscoll . [facility] [place] -[phone].    ATENCIÓN: Si habla español, tiene a valdez disposición servicios gratuitos de asistencia lingüística. Ll[facility] [phone].    We comply with applicable federal civil rights laws and [place] laws. We do not discriminate on the basis of race, color, national origin, age, disability, sex, sexual orientation, or gender identity.            Thank you!     Thank you for choosing [facility]  for your care. Our goal is always to provide you with excellent care. Hearing back from our patients is one way we can continue to improve our services. Please take a few minutes to complete the written survey that you may receive in the mail after your visit with us. Thank you!             Your Updated Medication List - Protect others around you: Learn how to safely use, store and throw away your medicines at www.disposemymeds.org.          This list is accurate as of: 11/9/17  4:11 PM.  Always use your most recent med list.                   Brand Name Dispense Instructions for use Diagnosis    alendronate 70 MG tablet    FOSAMAX    12 tablet    Take 1 tablet (70 mg) by mouth every 7 days Take with over 8 ounces water and stay upright for at least 30 minutes after dose.  Take at least 60 minutes before breakfast    Osteopenia       aspirin 325 MG EC tablet      Take 325  mg by mouth daily Reported on 3/17/2017    Health Care Home, Status post total left knee replacement       atenolol 25 MG tablet    TENORMIN    90 tablet    Take 1 tablet (25 mg) by mouth daily    Benign essential hypertension       CALCIUM + D 500-1000-40 MG-UNT-MCG Chew   Generic drug:  Calcium-Vitamin D-Vitamin K      Take 1 tablet by mouth 2 times daily Reported on 4/20/2017        CENTRUM SILVER ADULT 50+ PO      Take 1 tablet by mouth daily Reported on 4/20/2017        citalopram 20 MG tablet    celeXA    90 tablet    TAKE 1 TABLET (20 MG) BY MOUTH DAILY    Major depressive disorder, single episode, moderate (H)       HYDROcodone-acetaminophen 5-325 MG per tablet    NORCO    90 tablet    Take 1 tablet by mouth every 8 hours as needed for moderate to severe pain maximum 3 tablet(s) per day    Chronic pain of left knee       ipratropium 0.06 % spray    ATROVENT    1 Box    Spray 2 sprays into both nostrils 4 times daily as needed for rhinitis    Chronic rhinitis       * order for DME     1 Device    Equipment being ordered: blood pressure machine    Orthostatic hypotension       * order for DME     1 Device    Equipment being ordered: 10-20 mm Hg knee high compression stockings    Venous stasis dermatitis of left lower extremity       * predniSONE 1 MG tablet    DELTASONE     Take 3 mg by mouth daily Taper as directed    PMR (polymyalgia rheumatica) (H)       * predniSONE 5 MG tablet    DELTASONE    120 tablet    Take 1 tablet (5 mg) by mouth daily    PMR (polymyalgia rheumatica) (H)       senna-docusate 8.6-50 MG per tablet    SENOKOT-S;PERICOLACE     Take 2 tablets by mouth 2 times daily Reported on 4/20/2017    Health Care Home, Status post total left knee replacement       triamcinolone 0.1 % cream    KENALOG    45 g    Apply sparingly to affected area two times daily for 7 days.    Rash       TYLENOL 500 MG tablet   Generic drug:  acetaminophen      Take 2 tablets (1,000 mg) by mouth every 6 hours as needed  (Max acetaminophen dose: 4000mg in 24 hrs.)    Health Care Home, Status post total left knee replacement       vitamin D 1000 UNITS capsule      Take 1 capsule by mouth daily Reported on 4/20/2017        * Notice:  This list has 4 medication(s) that are the same as other medications prescribed for you. Read the directions carefully, and ask your doctor or other care provider to review them with you.

## 2020-06-29 NOTE — PROGRESS NOTE
Assessment and Plan





(1) Necrotizing fasciitis


Current Visit: Yes   Status: Acute   


Plan to address problem: 


Pt stable. s/p Excisional debridement of necrotizing fasciitis bilateral 

buttocks - 6/26 - POD#3.  Overall, patient appears to be showing signs of 

improvement.  She has been afebrile.  She does not appear ill.  Blood sugars are

better controlled.





Recommendation:


1. continue dressing changes - restart dakins packing BID


2. continue abx per ID -> transition to oral when ok with Dr. Landry


3. transition to oral pain medication


4. Will benefit from wound vac. Pt compliance is questionable however.  


5. Pt states she has [employer] insurance - CM Kiki will follow up.


6. pt to be set up for follow up in [facility] upon dc


7. ok to shower


8. ok to downgrade from surgery standpoint





Please call with any questions








Subjective


Date of service: 06/29/20


Narrative: 





Pt seen and examined. Upset mostly about having to be in the hospital and not 

being able to eat what she wants. She states that she has been doing well with 

dressing changes over the weekend. Asking when she can go home. Afebrile.





Objective


                               Vital Signs - 12hr











  06/28/20 06/28/20 06/28/20





  23:00 23:30 23:48


 


Temperature   


 


Pulse Rate 64 74 75


 


Pulse Rate [   





From Monitor]   


 


Respiratory 19 17 15





Rate   


 


Blood Pressure 134/57 130/68 130/68


 


O2 Sat by Pulse 98 100 100





Oximetry   














  06/29/20 06/29/20 06/29/20





  00:00 00:30 01:00


 


Temperature 98.3 F  


 


Pulse Rate 69 69 71


 


Pulse Rate [ 69  





From Monitor]   


 


Respiratory 16 17 19





Rate   


 


Blood Pressure 130/68 98/50 98/50


 


O2 Sat by Pulse 99 100 100





Oximetry   














  06/29/20 06/29/20 06/29/20





  01:30 02:00 02:30


 


Temperature   


 


Pulse Rate 79 69 69


 


Pulse Rate [   





From Monitor]   


 


Respiratory 15 18 24





Rate   


 


Blood Pressure 107/59 107/59 107/59


 


O2 Sat by Pulse 99 100 97





Oximetry   














  06/29/20 06/29/20 06/29/20





  03:00 03:30 04:00


 


Temperature   97.9 F


 


Pulse Rate 65 73 70


 


Pulse Rate [   70





From Monitor]   


 


Respiratory 15 18 17





Rate   


 


Blood Pressure 101/62 104/62 104/62


 


O2 Sat by Pulse 100 100 100





Oximetry   














  06/29/20 06/29/20 06/29/20





  04:30 05:00 05:30


 


Temperature   


 


Pulse Rate 66 73 76


 


Pulse Rate [   





From Monitor]   


 


Respiratory 16 18 17





Rate   


 


Blood Pressure 96/60 96/60 102/57


 


O2 Sat by Pulse 100 99 100





Oximetry   














  06/29/20 06/29/20 06/29/20





  06:00 06:30 07:00


 


Temperature   


 


Pulse Rate 68 62 75


 


Pulse Rate [   





From Monitor]   


 


Respiratory 11 L 17 13





Rate   


 


Blood Pressure 102/57 102/57 106/55


 


O2 Sat by Pulse 88 99 100





Oximetry   














  06/29/20 06/29/20 06/29/20





  07:30 08:00 08:30


 


Temperature  98.0 F 


 


Pulse Rate 62 73 75


 


Pulse Rate [   





From Monitor]   


 


Respiratory 13 13 22





Rate   


 


Blood Pressure 134/71 134/71 68/35


 


O2 Sat by Pulse 98 96 95





Oximetry   














  06/29/20 06/29/20





  09:00 10:20


 


Temperature  


 


Pulse Rate 76 


 


Pulse Rate [  





From Monitor]  


 


Respiratory 20 14





Rate  


 


Blood Pressure 152/61 


 


O2 Sat by Pulse 94 





Oximetry  














- General physical appearance


Narrative Exam: 





Gen; AAOx3. moderate distress due to pain(dressing was just removed) and tearful


CV: S1, S2+


Resp; even and unlabored


Gluteal: Bilateral gluteal wounds, R>L. Both wounds with slough on wound bed. No

odor or drainage. NO obvious necrotic tissue








- Labs





                                 06/29/20 05:30





                                 06/27/20 05:30

## 2020-06-30 VITALS — DIASTOLIC BLOOD PRESSURE: 66 MMHG | SYSTOLIC BLOOD PRESSURE: 143 MMHG

## 2020-06-30 RX ADMIN — INSULIN GLARGINE SCH UNITS: 100 INJECTION, SOLUTION SUBCUTANEOUS at 00:51

## 2020-06-30 RX ADMIN — HYDROMORPHONE HYDROCHLORIDE PRN MG: 1 INJECTION, SOLUTION INTRAMUSCULAR; INTRAVENOUS; SUBCUTANEOUS at 00:59

## 2020-06-30 RX ADMIN — HYDROCODONE BITARTRATE AND ACETAMINOPHEN PRN EACH: 5; 325 TABLET ORAL at 06:29

## 2020-06-30 RX ADMIN — HYDROCODONE BITARTRATE AND ACETAMINOPHEN PRN EACH: 5; 325 TABLET ORAL at 13:36

## 2020-06-30 RX ADMIN — Medication SCH ML: at 10:06

## 2020-06-30 RX ADMIN — INSULIN LISPRO SCH: 100 INJECTION, SOLUTION INTRAVENOUS; SUBCUTANEOUS at 08:43

## 2020-06-30 RX ADMIN — INSULIN LISPRO SCH: 100 INJECTION, SOLUTION INTRAVENOUS; SUBCUTANEOUS at 13:22

## 2020-06-30 RX ADMIN — INSULIN HUMAN SCH UNIT: 100 INJECTION, SUSPENSION SUBCUTANEOUS at 08:45

## 2020-06-30 RX ADMIN — INSULIN LISPRO SCH: 100 INJECTION, SOLUTION INTRAVENOUS; SUBCUTANEOUS at 00:51

## 2020-06-30 RX ADMIN — DOXYCYCLINE HYCLATE SCH MG: 100 CAPSULE ORAL at 10:38

## 2020-06-30 NOTE — DISCHARGE SUMMARY
Providers





- Providers


Date of Admission: 


06/26/20 04:17





Date of discharge: 06/30/20


Attending physician: 


AMY J KOCHERLA





                                        





06/26/20 02:53


Consult to Physician [CONS] Urgent 


   Comment: 


   Consulting Provider: ANN VALE


   Physician Instructions: 


   Reason For Exam: perianal abscess





06/26/20 10:09


Consult to Physician [CONS] Routine 


   Comment: 


   Consulting Provider: VENKATESH MONTES


   Physician Instructions: 


   Reason For Exam: necrotizing fasciitis





06/26/20 10:15


Consult to Case Management [CONS] Routine 


   Services Needed at Discharge: Home Health Services


                                   Wound Vac


   Notified:: -





06/26/20 12:52


Consult to Physician [CONS] Routine 


   Comment: 


   Consulting Provider: VENKATESH MONTES


   Physician Instructions: 


   Reason For Exam: abscess





06/26/20 13:03


Consult to Wound/ET Nurse [CONS] Routine 


   Reason For Exam: buttock wounds - necrotizing fasciitis





06/27/20 23:19


PICC Line Insertion [Consult to PICC Line RN] [CONS] Urgent 


   Reason For Exam: NO IV access


   Type Line:: PICC











Primary care physician: 


PRIMARY CARE MD








Hospitalization


Condition: Stable


Disposition: DC-30 STILL A PATIENT





Exam





- Constitutional


Vitals: 


                                        











Temp Pulse Resp BP Pulse Ox


 


 98.8 F   76   18   145/65   92 


 


 06/30/20 04:34  06/30/20 04:34  06/30/20 04:34  06/30/20 04:34  06/30/20 04:34














Plan


Activity: no restrictions


Diet: regular


Follow up with: 


ANN VALE DO [Staff Physician] - 7 Days


PRIMARY CARE,MD [Primary Care Provider] - 7 Days


VENKATESH MONTES MD [Staff Physician] - 7 Days


Prescriptions: 


metroNIDAZOLE [Flagyl TAB] 500 mg PO Q8HR #12 tablet


levoFLOXacin [Levaquin TAB] 750 mg PO Q24HR #3 tablet


HYDROcodone/APAP 5-325 [Norco 5-325 mg TAB] 2 each PO BID PRN #20 tablet


 PRN Reason: Wound Care


DOXYCYCLINE Hyclate [Vibramycin CAP] 100 mg PO BID #7 tab

## 2020-06-30 NOTE — PROGRESS NOTE
Assessment and Plan








(1) Necrotizing fasciitis


Current Visit: Yes   Status: Acute   


Plan to address problem: 


Pt stable. s/p Excisional debridement of necrotizing fasciitis bilateral 

buttocks - 6/26 - POD#4.  








Recommendation:


1. continue dressing changes - dakins packing BID. 


2. continue abx per ID 


3. prn oral pain medication


4. Pt does not have active insurance. Tiki application to be provided by 

. 


5. strict glucose control


6. anemia - pt refused blood transfusion. Asymptomatic


7. Ok to dc from surgery standpoint. Pt to follow up in surgery clinic upon dc 

and will eventually need to be transitioned to the wound care center. Verbal 

wound care instructions given to patient and she assures me she will be able to 

care for wound on her own. 





Discussed with Dr. Kocherla.


Please call with any questions








Subjective


Date of service: 06/30/20


Narrative: 





Pt seen and examined. No pain. Tolerating diet. No f/c. Pt states she can now 

lay on her buttocks. She is walking to and from bathroom. She states she feels 

well and is asking repeatedly to go home.





Objective


                               Vital Signs - 12hr











  06/30/20





  04:34


 


Temperature 98.8 F


 


Pulse Rate 76


 


Respiratory 18





Rate 


 


Blood Pressure 145/65


 


O2 Sat by Pulse 92





Oximetry 














- General physical appearance


Narrative Exam: 





Gen: AAOx3. NAD


CV: s1, S2+


Resp: even and unlabored


Gluteal: dressing c/d/i





- Labs





                                 06/29/20 05:30





                                 06/27/20 05:30

## 2020-06-30 NOTE — PROGRESS NOTE
Assessment and Plan


Cultures:


Blood culture 6/26/2020 no growth today.


OR culture 6/26/2020 negative





Assessment: 40 years old female with history of asthma, diabetes type 2, left 

CVA x2, admitted on 6/25/2020 due to severe right buttocks pain:





#Severe sepsis: remains with leukocytosis ? reactive due to anemia; likely due 

to necrotizing fasciitis of the right and left buttocks





#Right and left buttocks necrotizing fasciitis: Unclear etiology, so far blood 

cultures are negative.  Underwent extensive debridement on6/26/2020 culture NO 

growth.





#Anemia





#Uncontrolled diabetes:  A1c 8.9.  Glucose 332. Strict glycemic control.





Recommendations:


monitor leukocytosis 


Continue empiric levaquin 750 mg po, flagyl 500 mg po tid and doxycycline 100 mg

po bid total 7 days post debridement until 7/3/2020





Will sign off





Ana Dee MD


Infectious Diseases Consultant 


[facility] ([facility]


M [phone]


O [phone]





Subjective


Date of service: 06/30/20


Principal diagnosis: nec fascitis


Interval history: 


Feels better, no fever, pain under control








Objective





- Exam


Narrative Exam: 


General appearance: Alert in NAD on NC O2


Eyes: anicteric sclerae, moist conjunctivae; no lid-lag; PERRLA 


HENT: Atraumatic; oropharynx clear partiale dentulous


Lungs: CTA, with normal respiratory effort and no intercostal retractions 


CV: RRR no murmur 


Abdomen: Soft, non-tender; no masses or hepatosplenomegaly


Extremities: no edema, no cyanosis


Skin: Sami buttocks surgical dressings


Psych: Appropriate affect, alert and oriented to person, place and time. 


Neuro: alert and oriented x 3. Moving all extermities








 





- Constitutional


Vitals: 


                                   Vital Signs











Temp Pulse Resp BP Pulse Ox


 


 98.8 F   76   18   145/65   92 


 


 06/30/20 04:34  06/30/20 04:34  06/30/20 04:34  06/30/20 04:34  06/30/20 04:34








                           Temperature -Last 24 Hours











Temperature                    98.8 F


 


Temperature                    98.4 F


 


Temperature                    98.3 F


 


Temperature                    98.2 F


 


Temperature                    98.3 F

















- Labs


CBC & Chem 7: 


                                 06/29/20 05:30





                                 06/27/20 05:30


Labs: 


                              Abnormal lab results











  06/29/20 06/29/20 06/29/20 Range/Units





  05:30 07:55 10:40 


 


Eosinophils % (Manual)  5.0 H    (0.0-4.3)  %


 


Seg Neutrophils # Man  10.3 H    (1.8-7.7)  K/mm3


 


Monocytes # (Manual)  1.1 H    (0.0-0.8)  K/mm3


 


Eosinophils # (Manual)  0.8 H    (0.0-0.4)  K/mm3


 


POC Glucose   134 H   ()  


 


Crossmatch    See Detail  














  06/29/20 06/29/20 06/30/20 Range/Units





  11:13 21:45 01:04 


 


Eosinophils % (Manual)     (0.0-4.3)  %


 


Seg Neutrophils # Man     (1.8-7.7)  K/mm3


 


Monocytes # (Manual)     (0.0-0.8)  K/mm3


 


Eosinophils # (Manual)     (0.0-0.4)  K/mm3


 


POC Glucose  138 H  197 H  131 H  ()  


 


Crossmatch     














  06/30/20 Range/Units





  07:46 


 


Eosinophils % (Manual)   (0.0-4.3)  %


 


Seg Neutrophils # Man   (1.8-7.7)  K/mm3


 


Monocytes # (Manual)   (0.0-0.8)  K/mm3


 


Eosinophils # (Manual)   (0.0-0.4)  K/mm3


 


POC Glucose  135 H  ()  


 


Crossmatch